# Patient Record
Sex: FEMALE | Race: BLACK OR AFRICAN AMERICAN | NOT HISPANIC OR LATINO | Employment: FULL TIME | RURAL
[De-identification: names, ages, dates, MRNs, and addresses within clinical notes are randomized per-mention and may not be internally consistent; named-entity substitution may affect disease eponyms.]

---

## 2020-10-02 ENCOUNTER — HISTORICAL (OUTPATIENT)
Dept: ADMINISTRATIVE | Facility: HOSPITAL | Age: 30
End: 2020-10-02

## 2020-10-06 LAB
REPORT: NORMAL

## 2020-10-07 LAB
BACTERIA #/AREA URNS HPF: ABNORMAL /HPF
BILIRUB UR QL STRIP: NEGATIVE MG/DL
CLARITY UR: ABNORMAL
COLOR UR: YELLOW
GLUCOSE UR STRIP-MCNC: NEGATIVE MG/DL
KETONES UR STRIP-SCNC: NEGATIVE MG/DL
LEUKOCYTE ESTERASE UR QL STRIP: ABNORMAL LEU/UL
NITRITE UR QL STRIP: NEGATIVE
PH UR STRIP: 5.5 PH UNITS (ref 5–8)
PROT UR QL STRIP: NEGATIVE MG/DL
RBC # UR STRIP: ABNORMAL ERY/UL
RBC #/AREA URNS HPF: ABNORMAL /HPF (ref 0–3)
SP GR UR STRIP: >1.03 (ref 1–1.03)
SQUAMOUS #/AREA URNS LPF: ABNORMAL /LPF
UROBILINOGEN UR STRIP-ACNC: NORMAL MG/DL
WBC #/AREA URNS HPF: ABNORMAL /HPF (ref 0–5)
YEAST #/AREA URNS HPF: ABNORMAL /HPF

## 2020-11-25 ENCOUNTER — HISTORICAL (OUTPATIENT)
Dept: ADMINISTRATIVE | Facility: HOSPITAL | Age: 30
End: 2020-11-25

## 2020-11-25 LAB — SARS-COV+SARS-COV-2 AG RESP QL IA.RAPID: NEGATIVE

## 2021-10-31 ENCOUNTER — HOSPITAL ENCOUNTER (EMERGENCY)
Facility: HOSPITAL | Age: 31
Discharge: HOME | End: 2021-10-31
Payer: COMMERCIAL

## 2021-10-31 VITALS
BODY MASS INDEX: 33.31 KG/M2 | HEART RATE: 90 BPM | TEMPERATURE: 99 F | RESPIRATION RATE: 16 BRPM | DIASTOLIC BLOOD PRESSURE: 86 MMHG | OXYGEN SATURATION: 100 % | HEIGHT: 62 IN | SYSTOLIC BLOOD PRESSURE: 129 MMHG | WEIGHT: 181 LBS

## 2021-10-31 DIAGNOSIS — R07.89 CHEST WALL PAIN: Primary | ICD-10-CM

## 2021-10-31 DIAGNOSIS — R07.9 CHEST PAIN: ICD-10-CM

## 2021-10-31 LAB
ALBUMIN SERPL BCP-MCNC: 3.8 G/DL (ref 3.5–5)
ALBUMIN/GLOB SERPL: 1 {RATIO}
ALP SERPL-CCNC: 73 U/L (ref 37–98)
ALT SERPL W P-5'-P-CCNC: 30 U/L (ref 13–56)
AMORPH PHOS CRY #/AREA URNS LPF: ABNORMAL /LPF
ANION GAP SERPL CALCULATED.3IONS-SCNC: 13 MMOL/L (ref 7–16)
AST SERPL W P-5'-P-CCNC: 19 U/L (ref 15–37)
BACTERIA #/AREA URNS HPF: ABNORMAL /HPF
BASOPHILS # BLD AUTO: 0.03 K/UL (ref 0–0.2)
BASOPHILS NFR BLD AUTO: 0.4 % (ref 0–1)
BILIRUB SERPL-MCNC: 0.5 MG/DL (ref 0–1.2)
BILIRUB UR QL STRIP: NEGATIVE
BUN SERPL-MCNC: 9 MG/DL (ref 7–18)
BUN/CREAT SERPL: 12 (ref 6–20)
CALCIUM SERPL-MCNC: 8.9 MG/DL (ref 8.5–10.1)
CHLORIDE SERPL-SCNC: 103 MMOL/L (ref 98–107)
CLARITY UR: ABNORMAL
CO2 SERPL-SCNC: 29 MMOL/L (ref 21–32)
COLOR UR: ABNORMAL
CREAT SERPL-MCNC: 0.77 MG/DL (ref 0.55–1.02)
DIFFERENTIAL METHOD BLD: ABNORMAL
EOSINOPHIL # BLD AUTO: 0.06 K/UL (ref 0–0.5)
EOSINOPHIL NFR BLD AUTO: 0.8 % (ref 1–4)
ERYTHROCYTE [DISTWIDTH] IN BLOOD BY AUTOMATED COUNT: 14.4 % (ref 11.5–14.5)
GLOBULIN SER-MCNC: 4 G/DL (ref 2–4)
GLUCOSE SERPL-MCNC: 81 MG/DL (ref 74–106)
GLUCOSE UR STRIP-MCNC: NEGATIVE MG/DL
HCG SERUM QUALITATIVE: NEGATIVE
HCT VFR BLD AUTO: 40.3 % (ref 38–47)
HGB BLD-MCNC: 13.7 G/DL (ref 12–16)
IMM GRANULOCYTES # BLD AUTO: 0.02 K/UL (ref 0–0.04)
IMM GRANULOCYTES NFR BLD: 0.3 % (ref 0–0.4)
KETONES UR STRIP-SCNC: NEGATIVE MG/DL
LEUKOCYTE ESTERASE UR QL STRIP: ABNORMAL
LYMPHOCYTES # BLD AUTO: 3.11 K/UL (ref 1–4.8)
LYMPHOCYTES NFR BLD AUTO: 42.4 % (ref 27–41)
MCH RBC QN AUTO: 28.7 PG (ref 27–31)
MCHC RBC AUTO-ENTMCNC: 34 G/DL (ref 32–36)
MCV RBC AUTO: 84.5 FL (ref 80–96)
MONOCYTES # BLD AUTO: 0.84 K/UL (ref 0–0.8)
MONOCYTES NFR BLD AUTO: 11.5 % (ref 2–6)
MPC BLD CALC-MCNC: 9.9 FL (ref 9.4–12.4)
MUCOUS THREADS #/AREA URNS HPF: ABNORMAL /HPF
NEUTROPHILS # BLD AUTO: 3.27 K/UL (ref 1.8–7.7)
NEUTROPHILS NFR BLD AUTO: 44.6 % (ref 53–65)
NITRITE UR QL STRIP: NEGATIVE
NRBC # BLD AUTO: 0 X10E3/UL
NRBC, AUTO (.00): 0 %
NT-PROBNP SERPL-MCNC: 47 PG/ML (ref 1–125)
PH UR STRIP: 8 PH UNITS
PLATELET # BLD AUTO: 248 K/UL (ref 150–400)
POTASSIUM SERPL-SCNC: 3.8 MMOL/L (ref 3.5–5.1)
PROT SERPL-MCNC: 7.8 G/DL (ref 6.4–8.2)
PROT UR QL STRIP: NEGATIVE
RBC # BLD AUTO: 4.77 M/UL (ref 4.2–5.4)
RBC # UR STRIP: NEGATIVE /UL
RBC #/AREA URNS HPF: ABNORMAL /HPF
SODIUM SERPL-SCNC: 141 MMOL/L (ref 136–145)
SP GR UR STRIP: 1.02
SQUAMOUS #/AREA URNS LPF: ABNORMAL /LPF
TROPONIN I SERPL-MCNC: <0.017 NG/ML
UROBILINOGEN UR STRIP-ACNC: 0.2 MG/DL
WBC # BLD AUTO: 7.33 K/UL (ref 4.5–11)
WBC #/AREA URNS HPF: ABNORMAL /HPF

## 2021-10-31 PROCEDURE — 84075 ASSAY ALKALINE PHOSPHATASE: CPT | Performed by: NURSE PRACTITIONER

## 2021-10-31 PROCEDURE — 36415 COLL VENOUS BLD VENIPUNCTURE: CPT | Performed by: NURSE PRACTITIONER

## 2021-10-31 PROCEDURE — 84703 CHORIONIC GONADOTROPIN ASSAY: CPT | Performed by: NURSE PRACTITIONER

## 2021-10-31 PROCEDURE — 83880 ASSAY OF NATRIURETIC PEPTIDE: CPT | Performed by: NURSE PRACTITIONER

## 2021-10-31 PROCEDURE — 93005 ELECTROCARDIOGRAM TRACING: CPT

## 2021-10-31 PROCEDURE — 84484 ASSAY OF TROPONIN QUANT: CPT | Performed by: NURSE PRACTITIONER

## 2021-10-31 PROCEDURE — 81001 URINALYSIS AUTO W/SCOPE: CPT | Performed by: NURSE PRACTITIONER

## 2021-10-31 PROCEDURE — 96372 THER/PROPH/DIAG INJ SC/IM: CPT

## 2021-10-31 PROCEDURE — 85025 COMPLETE CBC W/AUTO DIFF WBC: CPT | Performed by: NURSE PRACTITIONER

## 2021-10-31 PROCEDURE — 93010 ELECTROCARDIOGRAM REPORT: CPT | Mod: ,,, | Performed by: HOSPITALIST

## 2021-10-31 PROCEDURE — 99284 PR EMERGENCY DEPT VISIT,LEVEL IV: ICD-10-PCS | Mod: ,,, | Performed by: NURSE PRACTITIONER

## 2021-10-31 PROCEDURE — 80053 COMPREHEN METABOLIC PANEL: CPT | Performed by: NURSE PRACTITIONER

## 2021-10-31 PROCEDURE — 99285 EMERGENCY DEPT VISIT HI MDM: CPT | Mod: 25

## 2021-10-31 PROCEDURE — 63600175 PHARM REV CODE 636 W HCPCS: Performed by: NURSE PRACTITIONER

## 2021-10-31 PROCEDURE — 93010 EKG 12-LEAD: ICD-10-PCS | Mod: ,,, | Performed by: HOSPITALIST

## 2021-10-31 PROCEDURE — 81003 URINALYSIS AUTO W/O SCOPE: CPT | Performed by: NURSE PRACTITIONER

## 2021-10-31 PROCEDURE — 99284 EMERGENCY DEPT VISIT MOD MDM: CPT | Mod: ,,, | Performed by: NURSE PRACTITIONER

## 2021-10-31 RX ORDER — DOXYCYCLINE 100 MG/1
CAPSULE ORAL
COMMUNITY
Start: 2021-10-25 | End: 2021-12-02

## 2021-10-31 RX ORDER — KETOROLAC TROMETHAMINE 30 MG/ML
30 INJECTION, SOLUTION INTRAMUSCULAR; INTRAVENOUS
Status: COMPLETED | OUTPATIENT
Start: 2021-10-31 | End: 2021-10-31

## 2021-10-31 RX ORDER — CLINDAMYCIN PHOSPHATE 10 UG/ML
LOTION TOPICAL
COMMUNITY
Start: 2021-09-18

## 2021-10-31 RX ADMIN — KETOROLAC TROMETHAMINE 30 MG: 30 INJECTION, SOLUTION INTRAMUSCULAR at 03:10

## 2021-11-03 ENCOUNTER — HOSPITAL ENCOUNTER (OUTPATIENT)
Dept: RADIOLOGY | Facility: HOSPITAL | Age: 31
Discharge: HOME | End: 2021-11-03
Attending: OBSTETRICS & GYNECOLOGY
Payer: COMMERCIAL

## 2021-11-03 DIAGNOSIS — E04.9 ENLARGEMENT OF THYROID: ICD-10-CM

## 2021-11-03 PROCEDURE — 76536 US EXAM OF HEAD AND NECK: CPT | Mod: 26,,, | Performed by: RADIOLOGY

## 2021-11-03 PROCEDURE — 76536 US SOFT TISSUE HEAD NECK THYROID: ICD-10-PCS | Mod: 26,,, | Performed by: RADIOLOGY

## 2021-11-03 PROCEDURE — 76536 US EXAM OF HEAD AND NECK: CPT | Mod: TC

## 2021-12-02 ENCOUNTER — OFFICE VISIT (OUTPATIENT)
Dept: FAMILY MEDICINE | Facility: CLINIC | Age: 31
End: 2021-12-02
Payer: COMMERCIAL

## 2021-12-02 VITALS
TEMPERATURE: 98 F | WEIGHT: 180.19 LBS | DIASTOLIC BLOOD PRESSURE: 88 MMHG | SYSTOLIC BLOOD PRESSURE: 122 MMHG | HEIGHT: 62 IN | BODY MASS INDEX: 33.16 KG/M2 | HEART RATE: 109 BPM | OXYGEN SATURATION: 98 % | RESPIRATION RATE: 16 BRPM

## 2021-12-02 DIAGNOSIS — R73.03 PREDIABETES: ICD-10-CM

## 2021-12-02 PROCEDURE — 99212 OFFICE O/P EST SF 10 MIN: CPT | Mod: ,,, | Performed by: NURSE PRACTITIONER

## 2021-12-02 PROCEDURE — 99212 PR OFFICE/OUTPT VISIT, EST, LEVL II, 10-19 MIN: ICD-10-PCS | Mod: ,,, | Performed by: NURSE PRACTITIONER

## 2022-04-01 ENCOUNTER — OFFICE VISIT (OUTPATIENT)
Dept: FAMILY MEDICINE | Facility: CLINIC | Age: 32
End: 2022-04-01
Payer: COMMERCIAL

## 2022-04-01 VITALS
SYSTOLIC BLOOD PRESSURE: 132 MMHG | TEMPERATURE: 98 F | HEART RATE: 84 BPM | BODY MASS INDEX: 32.2 KG/M2 | HEIGHT: 62 IN | RESPIRATION RATE: 16 BRPM | WEIGHT: 175 LBS | OXYGEN SATURATION: 98 % | DIASTOLIC BLOOD PRESSURE: 90 MMHG

## 2022-04-01 DIAGNOSIS — R42 DIZZINESS: Primary | ICD-10-CM

## 2022-04-01 DIAGNOSIS — R42 DIZZINESS AND GIDDINESS: ICD-10-CM

## 2022-04-01 PROCEDURE — 1159F PR MEDICATION LIST DOCUMENTED IN MEDICAL RECORD: ICD-10-PCS | Mod: ,,, | Performed by: NURSE PRACTITIONER

## 2022-04-01 PROCEDURE — 1160F RVW MEDS BY RX/DR IN RCRD: CPT | Mod: ,,, | Performed by: NURSE PRACTITIONER

## 2022-04-01 PROCEDURE — 3080F PR MOST RECENT DIASTOLIC BLOOD PRESSURE >= 90 MM HG: ICD-10-PCS | Mod: ,,, | Performed by: NURSE PRACTITIONER

## 2022-04-01 PROCEDURE — 3080F DIAST BP >= 90 MM HG: CPT | Mod: ,,, | Performed by: NURSE PRACTITIONER

## 2022-04-01 PROCEDURE — 99213 PR OFFICE/OUTPT VISIT, EST, LEVL III, 20-29 MIN: ICD-10-PCS | Mod: ,,, | Performed by: NURSE PRACTITIONER

## 2022-04-01 PROCEDURE — 99213 OFFICE O/P EST LOW 20 MIN: CPT | Mod: ,,, | Performed by: NURSE PRACTITIONER

## 2022-04-01 PROCEDURE — 1160F PR REVIEW ALL MEDS BY PRESCRIBER/CLIN PHARMACIST DOCUMENTED: ICD-10-PCS | Mod: ,,, | Performed by: NURSE PRACTITIONER

## 2022-04-01 PROCEDURE — 3075F SYST BP GE 130 - 139MM HG: CPT | Mod: ,,, | Performed by: NURSE PRACTITIONER

## 2022-04-01 PROCEDURE — 3008F BODY MASS INDEX DOCD: CPT | Mod: ,,, | Performed by: NURSE PRACTITIONER

## 2022-04-01 PROCEDURE — 1159F MED LIST DOCD IN RCRD: CPT | Mod: ,,, | Performed by: NURSE PRACTITIONER

## 2022-04-01 PROCEDURE — 3008F PR BODY MASS INDEX (BMI) DOCUMENTED: ICD-10-PCS | Mod: ,,, | Performed by: NURSE PRACTITIONER

## 2022-04-01 PROCEDURE — 3075F PR MOST RECENT SYSTOLIC BLOOD PRESS GE 130-139MM HG: ICD-10-PCS | Mod: ,,, | Performed by: NURSE PRACTITIONER

## 2022-04-01 RX ORDER — MECLIZINE HYDROCHLORIDE 50 MG/1
50 TABLET ORAL 2 TIMES DAILY
Qty: 60 TABLET | Refills: 2 | Status: SHIPPED | OUTPATIENT
Start: 2022-04-01 | End: 2023-02-27

## 2022-04-01 RX ORDER — SEMAGLUTIDE 1.34 MG/ML
0.5 INJECTION, SOLUTION SUBCUTANEOUS
COMMUNITY
Start: 2022-03-13 | End: 2023-02-27

## 2022-04-01 RX ORDER — CHOLECALCIFEROL (VITAMIN D3) 25 MCG
1000 TABLET ORAL DAILY
COMMUNITY
End: 2023-02-27

## 2022-04-01 NOTE — PROGRESS NOTES
"Subjective:       Patient ID: Renee Vargas is a 31 y.o. female.    Chief Complaint: Dizziness    Patient here today with dizziness for several years which occurs a few times every week either while lying still, sitting, or occasionally while standing. Patient denies dizziness related to positional changes. Patient describes dizzy spells as the sensation of her head "swimming." Additionally, patient reports intermittent throbbing of posterior neck. Patient saw neurologist in 2018 to evaluate who prescribed a medication for dizziness. They advised patient to only take this medicine for long spells of dizziness, so patient never took the medication because her dizzy spells only last a few minutes. Patient was told she would need a referral to return to neurology. Patient has not been evaluated by ENT.     Review of Systems   Constitutional: Negative for appetite change, fatigue, fever and unexpected weight change.   HENT: Negative for nasal congestion, ear discharge, ear pain, nosebleeds and sinus pressure/congestion.    Eyes: Negative for visual disturbance.   Respiratory: Negative for cough, chest tightness and shortness of breath.    Cardiovascular: Negative for chest pain, palpitations and leg swelling.   Gastrointestinal: Negative for abdominal distention, abdominal pain, change in bowel habit and change in bowel habit.   Genitourinary: Negative for dysuria.   Musculoskeletal: Positive for neck pain. Negative for back pain and gait problem.   Integumentary:  Positive for mole/lesion (Patient of [facility]). Negative for rash.   Neurological: Positive for dizziness. Negative for tremors, seizures, speech difficulty, weakness and headaches.   Hematological: Negative for adenopathy.   Psychiatric/Behavioral: Negative for sleep disturbance.         Objective:      Physical Exam  Vitals reviewed.   Constitutional:       General: She is not in acute distress.     Appearance: Normal appearance. She is not " ill-appearing.   HENT:      Right Ear: Tympanic membrane, ear canal and external ear normal.      Left Ear: Ear canal and external ear normal. There is impacted cerumen.      Nose: Nose normal.      Mouth/Throat:      Mouth: Mucous membranes are moist.   Eyes:      General:         Right eye: No discharge.         Left eye: No discharge.      Pupils: Pupils are equal, round, and reactive to light.   Cardiovascular:      Rate and Rhythm: Normal rate and regular rhythm.      Pulses: Normal pulses.      Heart sounds: Normal heart sounds.   Pulmonary:      Effort: Pulmonary effort is normal.      Breath sounds: Normal breath sounds.   Abdominal:      Palpations: Abdomen is soft.   Musculoskeletal:         General: Normal range of motion.      Cervical back: Normal range of motion and neck supple.   Lymphadenopathy:      Cervical: No cervical adenopathy.   Skin:     General: Skin is warm and dry.      Capillary Refill: Capillary refill takes less than 2 seconds.   Neurological:      Mental Status: She is alert and oriented to person, place, and time.   Psychiatric:         Mood and Affect: Mood normal.         Behavior: Behavior normal.         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 Will start meclizine. She has been evaluated by ENT and neurology and all was wnl. Will get MRI and if abnormal will refer if needed.      RTC for annual HY and PRN

## 2022-04-12 ENCOUNTER — HOSPITAL ENCOUNTER (OUTPATIENT)
Dept: RADIOLOGY | Facility: HOSPITAL | Age: 32
Discharge: HOME | End: 2022-04-12
Attending: NURSE PRACTITIONER
Payer: COMMERCIAL

## 2022-04-12 DIAGNOSIS — R42 DIZZINESS AND GIDDINESS: ICD-10-CM

## 2022-04-12 PROCEDURE — 70551 MRI BRAIN STEM W/O DYE: CPT | Mod: TC

## 2022-04-12 PROCEDURE — 70551 MRI BRAIN STEM W/O DYE: CPT | Mod: 26,,, | Performed by: RADIOLOGY

## 2022-04-12 PROCEDURE — 70551 MRI BRAIN WITHOUT CONTRAST: ICD-10-PCS | Mod: 26,,, | Performed by: RADIOLOGY

## 2023-02-27 ENCOUNTER — OFFICE VISIT (OUTPATIENT)
Dept: FAMILY MEDICINE | Facility: CLINIC | Age: 33
End: 2023-02-27
Payer: COMMERCIAL

## 2023-02-27 VITALS
DIASTOLIC BLOOD PRESSURE: 80 MMHG | SYSTOLIC BLOOD PRESSURE: 110 MMHG | TEMPERATURE: 98 F | HEIGHT: 62 IN | RESPIRATION RATE: 16 BRPM | WEIGHT: 179.38 LBS | OXYGEN SATURATION: 96 % | BODY MASS INDEX: 33.01 KG/M2 | HEART RATE: 91 BPM

## 2023-02-27 DIAGNOSIS — G89.29 CHRONIC BILATERAL LOW BACK PAIN WITHOUT SCIATICA: Primary | Chronic | ICD-10-CM

## 2023-02-27 DIAGNOSIS — M62.838 MUSCLE SPASM: ICD-10-CM

## 2023-02-27 DIAGNOSIS — M54.50 CHRONIC BILATERAL LOW BACK PAIN WITHOUT SCIATICA: Primary | Chronic | ICD-10-CM

## 2023-02-27 DIAGNOSIS — G89.29 CHRONIC SCAPULAR PAIN: Chronic | ICD-10-CM

## 2023-02-27 DIAGNOSIS — M89.8X1 CHRONIC SCAPULAR PAIN: Chronic | ICD-10-CM

## 2023-02-27 PROCEDURE — 3079F PR MOST RECENT DIASTOLIC BLOOD PRESSURE 80-89 MM HG: ICD-10-PCS | Mod: ,,, | Performed by: NURSE PRACTITIONER

## 2023-02-27 PROCEDURE — 3008F BODY MASS INDEX DOCD: CPT | Mod: ,,, | Performed by: NURSE PRACTITIONER

## 2023-02-27 PROCEDURE — 3079F DIAST BP 80-89 MM HG: CPT | Mod: ,,, | Performed by: NURSE PRACTITIONER

## 2023-02-27 PROCEDURE — 1160F RVW MEDS BY RX/DR IN RCRD: CPT | Mod: ,,, | Performed by: NURSE PRACTITIONER

## 2023-02-27 PROCEDURE — 1160F PR REVIEW ALL MEDS BY PRESCRIBER/CLIN PHARMACIST DOCUMENTED: ICD-10-PCS | Mod: ,,, | Performed by: NURSE PRACTITIONER

## 2023-02-27 PROCEDURE — 99214 OFFICE O/P EST MOD 30 MIN: CPT | Mod: ,,, | Performed by: NURSE PRACTITIONER

## 2023-02-27 PROCEDURE — 1159F MED LIST DOCD IN RCRD: CPT | Mod: ,,, | Performed by: NURSE PRACTITIONER

## 2023-02-27 PROCEDURE — 3074F PR MOST RECENT SYSTOLIC BLOOD PRESSURE < 130 MM HG: ICD-10-PCS | Mod: ,,, | Performed by: NURSE PRACTITIONER

## 2023-02-27 PROCEDURE — 3074F SYST BP LT 130 MM HG: CPT | Mod: ,,, | Performed by: NURSE PRACTITIONER

## 2023-02-27 PROCEDURE — 3008F PR BODY MASS INDEX (BMI) DOCUMENTED: ICD-10-PCS | Mod: ,,, | Performed by: NURSE PRACTITIONER

## 2023-02-27 PROCEDURE — 99214 PR OFFICE/OUTPT VISIT, EST, LEVL IV, 30-39 MIN: ICD-10-PCS | Mod: ,,, | Performed by: NURSE PRACTITIONER

## 2023-02-27 PROCEDURE — 1159F PR MEDICATION LIST DOCUMENTED IN MEDICAL RECORD: ICD-10-PCS | Mod: ,,, | Performed by: NURSE PRACTITIONER

## 2023-02-27 RX ORDER — IBUPROFEN 800 MG/1
800 TABLET ORAL 3 TIMES DAILY
Qty: 90 TABLET | Refills: 0 | Status: SHIPPED | OUTPATIENT
Start: 2023-02-27 | End: 2023-11-27 | Stop reason: SDUPTHER

## 2023-02-27 RX ORDER — CYCLOBENZAPRINE HCL 10 MG
10 TABLET ORAL NIGHTLY
Qty: 30 TABLET | Refills: 0 | Status: SHIPPED | OUTPATIENT
Start: 2023-02-27 | End: 2023-03-09

## 2023-02-27 NOTE — ASSESSMENT & PLAN NOTE
Will refer to PT for upper and lower back pain  IBU 800mg and cyclobenzaprine prn  Advised stretching and improving posture  Discussed hypertrophy of breast--but she was not open to breast reduction at this time

## 2023-02-27 NOTE — PROGRESS NOTES
Subjective:       Patient ID: Renee Vargas is a 32 y.o. female.    Chief Complaint: Back Pain (Upper and lower back pain that is intermittent.  No pain at present.)    Upper left back pain and chronic lower back pain    Back Pain  This is a recurrent problem. The current episode started more than 1 year ago. The problem occurs every several days. The problem has been gradually worsening since onset. The pain is present in the lumbar spine, sacro-iliac, thoracic spine and costovertebral angle. The quality of the pain is described as aching, shooting and stabbing. The pain radiates to the left thigh and right thigh (only occasionally). The pain is at a severity of 7/10. The pain is moderate. The pain is The same all the time. The symptoms are aggravated by lying down, twisting and stress. Stiffness is present All day. Associated symptoms include leg pain and weakness. Pertinent negatives include no abdominal pain, bladder incontinence, bowel incontinence, chest pain, dysuria, fever, headaches, numbness, paresis, paresthesias, pelvic pain, perianal numbness, tingling or weight loss. Risk factors include obesity, lack of exercise, poor posture and sedentary lifestyle. She has tried nothing for the symptoms. The treatment provided no relief.   Review of Systems   Constitutional:  Negative for appetite change, fatigue, fever, unexpected weight change and weight loss.   Eyes:  Negative for visual disturbance.   Respiratory:  Negative for cough, chest tightness and shortness of breath.    Cardiovascular:  Negative for chest pain, palpitations and leg swelling.   Gastrointestinal:  Negative for abdominal distention, abdominal pain, bowel incontinence, change in bowel habit and change in bowel habit.   Genitourinary:  Negative for bladder incontinence, dysuria and pelvic pain.   Musculoskeletal:  Positive for back pain and leg pain. Negative for gait problem.   Integumentary:  Negative for rash and mole/lesion.    Neurological:  Positive for weakness. Negative for dizziness, tingling, numbness, headaches and paresthesias.   Hematological:  Negative for adenopathy.   Psychiatric/Behavioral:  Negative for sleep disturbance.        Objective:      Physical Exam  Vitals reviewed.   Eyes:      Pupils: Pupils are equal, round, and reactive to light.   Cardiovascular:      Rate and Rhythm: Normal rate and regular rhythm.      Pulses: Normal pulses.      Heart sounds: Normal heart sounds.   Pulmonary:      Effort: Pulmonary effort is normal.      Breath sounds: Normal breath sounds.   Abdominal:      Palpations: Abdomen is soft.   Musculoskeletal:         General: Normal range of motion.      Cervical back: Normal range of motion and neck supple.      Thoracic back: Tenderness present. No swelling, edema, deformity, signs of trauma, lacerations, spasms or bony tenderness. Normal range of motion. No scoliosis.      Lumbar back: Tenderness present. No swelling, edema, deformity, signs of trauma, lacerations, spasms or bony tenderness. Normal range of motion. Negative right straight leg raise test and negative left straight leg raise test. No scoliosis.        Back:    Lymphadenopathy:      Cervical: No cervical adenopathy.   Skin:     General: Skin is warm and dry.      Capillary Refill: Capillary refill takes less than 2 seconds.   Neurological:      Mental Status: She is alert and oriented to person, place, and time.   Psychiatric:         Mood and Affect: Mood normal.         Behavior: Behavior normal.       Assessment:       Problem List Items Addressed This Visit          Orthopedic    Chronic scapular pain    Relevant Orders    Ambulatory referral/consult to Physical/Occupational Therapy    Chronic bilateral low back pain without sciatica - Primary    Relevant Medications    ibuprofen (ADVIL,MOTRIN) 800 MG tablet    Other Relevant Orders    Ambulatory referral/consult to Physical/Occupational Therapy    Muscle spasm    Relevant  Medications    cyclobenzaprine (FLEXERIL) 10 MG tablet         Plan:       Chronic bilateral low back pain without sciatica  Will refer to PT for upper and lower back pain  IBU 800mg and cyclobenzaprine prn  Advised stretching and improving posture  Discussed hypertrophy of breast--but she was not open to breast reduction at this time        Answers submitted by the patient for this visit:  Back Pain Questionnaire (Submitted on 2/26/2023)  Chief Complaint: Back pain

## 2023-02-27 NOTE — LETTER
February 27, 2023      [facility]  [address] [address]  Phone: [phone]  Fax: [phone]       Patient: Renee Vargas   YOB: 1990  Date of Visit: 02/27/2023    To Whom It May Concern:    Tomasa Vargas  was at [facility] on 02/27/2023. The patient may return to work/school on 02/28/23 with no restrictions. If you have any questions or concerns, or if I can be of further assistance, please do not hesitate to contact me.    Sincerely,    ABDIRIZAK Gloria

## 2023-03-08 NOTE — PROGRESS NOTES
See Plan of Care     Patient has some Right sided SI dysfunction   Re-Check LLD and provide METs if needed     Establish a Home Exercise Program and work up to machine exercises         Back Exercises    Bike/NuStep                 Calf Stretch    Hamstring Stretch    Pelvic Tilts    Bridging    Bridging with Abduction    Bridging/Hooklying with Marching    Bridging/Hooklying with Knee Ext    Trunk Rotation Exercise    Trunk Rotation Stretch    Ball - Trunk Rotation    Ball- Knee Extension    Planks    Quadraped

## 2023-03-10 ENCOUNTER — CLINICAL SUPPORT (OUTPATIENT)
Dept: REHABILITATION | Facility: HOSPITAL | Age: 33
End: 2023-03-10
Payer: COMMERCIAL

## 2023-03-10 DIAGNOSIS — M89.8X1 CHRONIC SCAPULAR PAIN: Chronic | ICD-10-CM

## 2023-03-10 DIAGNOSIS — G89.29 CHRONIC SCAPULAR PAIN: Chronic | ICD-10-CM

## 2023-03-10 DIAGNOSIS — M54.6 THORACIC SPINE PAIN: ICD-10-CM

## 2023-03-10 DIAGNOSIS — R29.898 WEAKNESS OF BOTH LOWER EXTREMITIES: ICD-10-CM

## 2023-03-10 DIAGNOSIS — R29.898 WEAKNESS OF BACK: ICD-10-CM

## 2023-03-10 DIAGNOSIS — M53.3 SACROILIAC PAIN: Primary | ICD-10-CM

## 2023-03-10 DIAGNOSIS — M54.50 CHRONIC BILATERAL LOW BACK PAIN WITHOUT SCIATICA: Chronic | ICD-10-CM

## 2023-03-10 DIAGNOSIS — G89.29 CHRONIC BILATERAL LOW BACK PAIN WITHOUT SCIATICA: Chronic | ICD-10-CM

## 2023-03-10 PROCEDURE — 97140 MANUAL THERAPY 1/> REGIONS: CPT

## 2023-03-10 PROCEDURE — 97161 PT EVAL LOW COMPLEX 20 MIN: CPT

## 2023-03-10 NOTE — PLAN OF CARE
[facility]   Physical Therapy Initial Evaluation    Date: 3/10/2023   Name: Renee Vargas  Clinic Number: [id number]    Therapy Diagnosis: Back Pain   Physician: Yamileth Castrejon FNP    Physician Orders: PT Eval and Treat   Medical Diagnosis from Referral: Thoracic and Low Back Pain  Evaluation Date: 3/10/2023  Authorization Period Expiration: 2/27/2024  Plan of Care Expiration: 5/5/2023  Visit # / Visits authorized: 1/ 20    Time In: 11:00 pm   Time Out: 12:00 pm   Total Appointment Time (timed & untimed codes): 60 minutes    Precautions: Standard    Subjective   Date of onset: 2/11/2023  History of current condition - Renee reports: She has had low back pain for years. The upper back pain started February 11, 2023. She was brushing her teeth and a sharp pain hit between her shoulder blades that caused her to hit her knees. She reports intense pain for approx 1 hour. The pain began to ease up a little after that. She reports this happens off and on and is completely random. At times she can just be sitting still and it will happen. The low back pain is constant but the Thoracic pain is intermittent. Her low back pain worsens with lying down and she has trouble rolling over. The low back pain is a little better when she is up and moving.     Patient saw her MD on 2/27/2023. MD Note States in part :   This is a recurrent problem. The current episode started more than 1 year ago. The problem occurs every several days. The problem has been gradually worsening since onset. The pain is present in the lumbar spine, sacro-iliac, thoracic spine and costovertebral angle. The quality of the pain is described as aching, shooting and stabbing. The pain radiates to the left thigh and right thigh (only occasionally). The pain is at a severity of 7/10. The pain is moderate. The pain is The same all the time. The symptoms are aggravated by lying down, twisting and stress. Stiffness is present All day.  "Associated symptoms include leg pain and weakness. Pertinent negatives include no abdominal pain, bladder incontinence, bowel incontinence, chest pain, dysuria, fever, headaches, numbness, paresis, paresthesias, pelvic pain, perianal numbness, tingling or weight loss. Risk factors include obesity, lack of exercise, poor posture and sedentary lifestyle     Plan : Chronic bilateral low back pain without sciatica  Will refer to PT for upper and lower back pain  IBU 800mg and cyclobenzaprine prn  Advised stretching and improving posture  Discussed hypertrophy of breast--but she was not open to breast reduction at this time     Medical History:   No past medical history on file.    Surgical History:   Renee Vargas  has no past surgical history on file.    Medications:   Renee has a current medication list which includes the following prescription(s): clindamycin, cyclobenzaprine, and ibuprofen.    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 I hate medicine."    Objective       Posture :     Standing Lordosis        [x]  Increased   []   Decreased   []  Within Normal Limits  Sitting Lordosis  [x]  Increased   []   Decreased   []  Within Normal Limits   Iliac Crest Height  [x]  Left Increased      [] Equal/Level  PSIS Height    [x]  Right Increased      [] Equal/Level  Pelvic Rot/Torsion       []   " Yes     [x]   No  Scoliosis    []  Yes   Concave Right/Left      [x]  No  Lateral Shift    []   Right     []   Left          [x]  Within Normal Limits  Comments : It appears she has an Anterior Rotation on the Right. Right PSIS is more painful and is hypomobile. Right leg was slightly longer in supine. Attempted an MET but was unsuccessful. Will continue to assess.         Strength :      Myotome/Muscle :  Left   Right     L1-2    Psoas  4-/5  3+/5    L3       Quadriceps  4+/5  4+/5    L4       Anterior Tibialis  4+/5  4+/5    L5       EHL   5/5   5/5     S1      Gastocnemius  5/5  5/5    S2      Hamstrings  4-/5  3+/5                    Strength :   Abdominals 3/5  Back Extensors 3/5  Multifidus 3/5      Range of Motion :     Back :    Forward Flexion 120 degrees    Back Bending 20 degrees    Side Bending Left 45 degrees   Side Bending Right 45 degrees    Rotation Left 75%   Rotation Right 75%    Hip :   Hamstrings : Slight Tightness Bilaterally   Piriformis : Slight Tightness Bilaterally - Right is painful       Special Tests :     SLR :   Right   +/- <60 Degrees     []   yes   [x]  No  ;   +/-  60-90 Degrees     []   Yes    [x]  No   Left     +/- <60 Degrees      []  yes    [x] No ;   +/-  60-90 Degrees       []   Yes    [x] No    Sitting Slump Test :  Left : [] Positive   [x]  Negative ;  Right : [] Positive   [x]  Negative   Lower Extremity Length :   [x] Right Longer     []  Within Normal Limits ; No actual LLD - this is due to rotation at the SI joint   MARLY : Left : [] Positive   []  Negative ;  Right : [] Positive   []  Negative       SI Joint :   Palpation   [x] Positive   []  Negative   Compression   [x] Positive   []  Negative   Distraction  [] Positive   [x]  Negative   Forward Bending [x] Positive   []  Negative       Dermatome : No numbness or tingling       Palpation : Muscle spasms noted along paraspinals from T Spine through sacrum. Piriformis is TTP.     T Spine :   Patient presents with forward  head and rounded shoulders  Shoulder Strength is grossly 4-/5.   Tone and spasms noted in Upper Traps and Rhomboids           Limitation/Restriction for FOTO Intake Lumbar Survey    Therapist reviewed FOTO scores for Renee Vargas on 3/10/2023.   FOTO documents entered into [employer] - see Media section.    Limitation Score: 59%         TREATMENT   Treatment Time In: 11:40 am   Treatment Time Out: 11:50 am   Total Treatment time (time-based codes) separate from Evaluation: 10 minutes      Renee received the treatments listed below:  MANUAL THERAPY TECHNIQUES including Joint mobilizations were applied to SI for 10 minutes.  Sacral Mobs   MET for Anterior Rotation of the [place]      Home Exercises and Patient Education Provided    Education provided:   - Discussed the findings from the Evaluation and Reviewed the Plan of Care    A Home Exercise Program will be created in the future       Assessment   Renee is a 32 y.o. female referred to outpatient Physical Therapy with a medical diagnosis of Low Back and T Spine Pain. Renee reports: She has had low back pain for years. The upper back pain started February 11, 2023. She was brushing her teeth and a sharp pain hit between her shoulder blades that caused her to hit her knees. She reports intense pain for approx 1 hour. The pain began to ease up a little after that. She reports this happens off and on and is completely random. At times she can just be sitting still and it will happen. The low back pain is constant but the Thoracic pain is intermittent. Her low back pain worsens with lying down and she has trouble rolling over. The low back pain is a little better when she is up and moving.   Patient presents with Poor Core, Upper Extremity, and Lower Extremity Strength. Patient appears to have lax ligaments and is double jointed in several joints. Feel this is contributing to her pain and instability. SI was assessed as most of her Low Back pain seems to be centered  here. It appears she has an Anterior Rotation on the Right. Right PSIS is more painful and is hypomobile. Right leg was slightly longer in supine. Attempted an MET but was unsuccessful. Will continue to assess. During the T Spine assessment, Patient presents with forward head and rounded shoulders. Shoulder Strength is grossly 4-/5. Tone and spasms were noted in Upper Traps and Rhomboids. Patient is generally weak overall. The weak muscles and the lax ligaments together are causing instability of her joints. She will require an overall strengthening program to help stabilize her joints.     Patient prognosis is Good.   Patientt will benefit from skilled outpatient Physical Therapy to address the deficits stated above and in the chart below, provide patient /family education, and to maximize patientt's level of independence.     Plan of care discussed with patient: Yes  Patient's spiritual, cultural and educational needs considered and patient is agreeable to the plan of care and goals as stated below:     Anticipated Barriers for therapy: Chronic Pain       Goals:  Short Term Goals: 4 weeks   1. Patient will be Independent with Home Exercise Program   2. Patient will demonstrate with improved Posture and Body Mechanics  3. Patient will increase Lumbosacral Range of Motion by 25%   4. Patient will increase Lower Extremity, Upper Extremity, and Core Strength to grossly 4+/5  5. Patient will decrease complaints of pain with activity to Less than or Equal to 5/10     Long Term Goals: 8 weeks   1. Patient will tolerate 30 minutes of activity with complaints of Pain at Less Than or Equal to 4/10        Plan   Plan of care Certification: 3/10/2023 to 5/5/2023.    Outpatient Physical Therapy 2 times weekly for 8 weeks to include the following interventions: Cervical/Lumbar Traction, Electrical Stimulation to decrease pain and inflammation as needed, Manual Therapy, Moist Heat/ Ice, Neuromuscular Re-ed, Patient Education,  Therapeutic Activities, and Therapeutic Exercise.     MARCO GOLDEN, PT, DPT       I CERTIFY THE NEED FOR THESE SERVICES FURNISHED UNDER THIS PLAN OF TREATMENT AND WHILE UNDER MY CARE.    Physician's comments:      Physician's Signature: ___________________________________________________

## 2023-03-14 ENCOUNTER — CLINICAL SUPPORT (OUTPATIENT)
Dept: REHABILITATION | Facility: HOSPITAL | Age: 33
End: 2023-03-14
Payer: COMMERCIAL

## 2023-03-14 DIAGNOSIS — R29.898 WEAKNESS OF BOTH LOWER EXTREMITIES: ICD-10-CM

## 2023-03-14 DIAGNOSIS — R29.898 WEAKNESS OF BACK: ICD-10-CM

## 2023-03-14 DIAGNOSIS — M53.3 SACROILIAC PAIN: Primary | ICD-10-CM

## 2023-03-14 DIAGNOSIS — M54.6 THORACIC SPINE PAIN: ICD-10-CM

## 2023-03-14 PROCEDURE — 97110 THERAPEUTIC EXERCISES: CPT | Mod: CQ

## 2023-03-14 PROCEDURE — 97112 NEUROMUSCULAR REEDUCATION: CPT | Mod: CQ

## 2023-03-14 NOTE — PROGRESS NOTES
[facility]   Physical Therapy Treatment Note     Name: Renee Vargas  Clinic Number: [id number]    Visit Date: 3/14/2023    Therapy Diagnosis: Back Pain   Physician: Yamileth Castrejon FNP     Physician Orders: PT Eval and Treat   Medical Diagnosis from Referral: Thoracic and Low Back Pain  Evaluation Date: 3/10/2023  Authorization Period Expiration: 2/27/2024  Plan of Care Expiration: 5/5/2023    Visit # / Visits authorized: 2/ 20  PTA Visit #: 1/5     Time In: 4:46 pm  Time Out: 5:21 pm  Total Billable Time: 35 minutes    SUBJECTIVE     Pt reports: she is feeling okay today with only 5/10 low back pain noted; no thoracic spine pain in a couple of weeks.  She was compliant with home exercise program.  Response to previous treatment: First since eval  Functional change: None    Pain: 5/10  Location: bilateral back      Prior Level of Function: Able to work and move without pain   Current Level of Function: Pain with mobility     OBJECTIVE     Objective Measures updated at progress report unless specified.     Treatment     Renee received the treatments listed below:      therapeutic exercises to develop strength, endurance, ROM, flexibility, and posture for 25 minutes including:     Bike/NuStep  3 minutes   Corner Stretch  3x30 seconds   TRX Hangouts  10x10 seconds   Calf Stretch     Hamstring Stretch  3x20 seconds (B)   Pelvic Tilts     Bridging     Bridging with Abduction  Green Band 30x   Bridging/Hooklying with Marching     Bridging/Hooklying with Knee Ext     Trunk Rotation Exercise  5x5 seconds each way   Trunk Rotation Stretch     Ball - Trunk Rotation  15x each way   Ball- Knee Extension     Planks     Quadraped     Prone Horizontal Abduction (B)  1# 10x10 sec with scapular retraction               manual therapy techniques: Joint mobilizations, Manual traction, and Myofacial release were applied to the: hips/back for 0 minutes, including:      neuromuscular  re-education activities to improve: Balance, Coordination, Kinesthetic, and Posture for 10 minutes. The following activities were included:  Retraction with Extension - green band 20x  Palloff Press (B) - green band 10x each way    therapeutic activities to improve functional performance for 0  minutes, including:      gait training to improve functional mobility and safety for 0  minutes, including:      Patient Education and Home Exercises     Home Exercises Provided and Patient Education Provided     Education provided:   - None    Written Home Exercises Provided: Patient instructed to cont prior HEP. Exercises were reviewed and Renee was able to demonstrate them prior to the end of the session.  Renee demonstrated fair  understanding of the education provided. See EMR under Patient Instructions for exercises provided during therapy sessions    ASSESSMENT     Pt tolerated all therapeutic exercises today and postural exercises with some fatigue noted. Pt has min loss of L thoracic rotation with tightness present. Progressed core/postural exercises with generalized fatigue noted. Will continue to progress as able.         PMH:  Renee is a 32 y.o. female referred to outpatient Physical Therapy with a medical diagnosis of Low Back and T Spine Pain. Renee reports: She has had low back pain for years. The upper back pain started February 11, 2023. She was brushing her teeth and a sharp pain hit between her shoulder blades that caused her to hit her knees. She reports intense pain for approx 1 hour. The pain began to ease up a little after that. She reports this happens off and on and is completely random. At times she can just be sitting still and it will happen. The low back pain is constant but the Thoracic pain is intermittent. Her low back pain worsens with lying down and she has trouble rolling over. The low back pain is a little better when she is up and moving.   Patient presents with Poor Core, Upper  Extremity, and Lower Extremity Strength. Patient appears to have lax ligaments and is double jointed in several joints. Feel this is contributing to her pain and instability. SI was assessed as most of her Low Back pain seems to be centered here. It appears she has an Anterior Rotation on the Right. Right PSIS is more painful and is hypomobile. Right leg was slightly longer in supine. Attempted an MET but was unsuccessful. Will continue to assess. During the T Spine assessment, Patient presents with forward head and rounded shoulders. Shoulder Strength is grossly 4-/5. Tone and spasms were noted in Upper Traps and Rhomboids. Patient is generally weak overall. The weak muscles and the lax ligaments together are causing instability of her joints. She will require an overall strengthening program to help stabilize her joints.       Renee Is progressing towards her goals.   Pt prognosis is Good.     Pt will continue to benefit from skilled outpatient physical therapy to address the deficits listed in the problem list box on initial evaluation, provide pt/family education and to maximize pt's level of independence in the home and community environment.     Pt's spiritual, cultural and educational needs considered and pt agreeable to plan of care and goals.     Anticipated barriers to physical therapy: Chronic low back issue    Goals: Short Term Goals: 4 weeks   1. Patient will be Independent with Home Exercise Program   2. Patient will demonstrate with improved Posture and Body Mechanics  3. Patient will increase Lumbosacral Range of Motion by 25%   4. Patient will increase Lower Extremity, Upper Extremity, and Core Strength to grossly 4+/5  5. Patient will decrease complaints of pain with activity to Less than or Equal to 5/10      Long Term Goals: 8 weeks   1. Patient will tolerate 30 minutes of activity with complaints of Pain at Less Than or Equal to 4/10         PLAN     Plan of care Certification: 3/10/2023 to  5/5/2023.     Outpatient Physical Therapy 2 times weekly for 8 weeks to include the following interventions: Cervical/Lumbar Traction, Electrical Stimulation to decrease pain and inflammation as needed, Manual Therapy, Moist Heat/ Ice, Neuromuscular Re-ed, Patient Education, Therapeutic Activities, and Therapeutic Exercise.     Brant Garcia, PTA   03/14/2023

## 2023-03-21 ENCOUNTER — CLINICAL SUPPORT (OUTPATIENT)
Dept: REHABILITATION | Facility: HOSPITAL | Age: 33
End: 2023-03-21
Payer: COMMERCIAL

## 2023-03-21 DIAGNOSIS — M54.6 THORACIC SPINE PAIN: ICD-10-CM

## 2023-03-21 DIAGNOSIS — R29.898 WEAKNESS OF BOTH LOWER EXTREMITIES: ICD-10-CM

## 2023-03-21 DIAGNOSIS — R29.898 WEAKNESS OF BACK: ICD-10-CM

## 2023-03-21 DIAGNOSIS — M53.3 SACROILIAC PAIN: Primary | ICD-10-CM

## 2023-03-21 PROCEDURE — 97112 NEUROMUSCULAR REEDUCATION: CPT

## 2023-03-21 PROCEDURE — 97110 THERAPEUTIC EXERCISES: CPT

## 2023-03-21 NOTE — PROGRESS NOTES
[facility]   Physical Therapy Treatment Note     Name: Renee Vargas  Clinic Number: [id number]    Visit Date: 3/21/2023    Therapy Diagnosis: Back Pain   Physician: Yamileth Castrejon FNP     Physician Orders: PT Eval and Treat   Medical Diagnosis from Referral: Thoracic and Low Back Pain  Evaluation Date: 3/10/2023  Authorization Period Expiration: 2/27/2024  Plan of Care Expiration: 5/5/2023    Visit # / Visits authorized: 3/ 20  PTA Visit #: 1/5     Time In: 4:50 pm  Time Out: 5:48 pm  Total Billable Time: 55 minutes    SUBJECTIVE     Pt reports: she had some slight muscle soreness after the last treatment session   She was compliant with home exercise program.  Response to previous treatment: slight muscle soreness after the last treatment session   Functional change: None    Pain: 3/10  Location: bilateral back      Prior Level of Function: Able to work and move without pain   Current Level of Function: Pain with mobility     OBJECTIVE     Objective Measures updated at progress report unless specified.     Treatment     Renee received the treatments listed below:      therapeutic exercises to develop strength, endurance, ROM, flexibility, and posture for 45 minutes including:     Bike/NuStep  3 minutes   Corner Stretch  3x30 seconds   TRX Hangouts  10x10 seconds   Calf Stretch     Hamstring Stretch  3x20 seconds (B)   Pelvic Tilts  x 10    Bridging  x 10    Bridging with Abduction  Green Band 15x   Hooklying with Marching  Green Band 10x   Hooklying with Knee Ext  10x   Trunk Rotation Exercise  x 10 bilateral    Trunk Rotation Stretch  2 x 15 sec hold    Ball - Trunk Rotation  15x each way   Ball- Knee Extension     Planks     Quadraped     Prone Horizontal Abduction (B)  1# 10x10 sec with scapular retraction               manual therapy techniques: Joint mobilizations, Manual traction, and Myofacial release were applied to the: hips/back for 0 minutes,  including:      neuromuscular re-education activities to improve: Balance, Coordination, Kinesthetic, and Posture for 10 minutes. The following activities were included:  Retraction with Extension - green band 20x  Palloff Press (B) - green band 10x each way    therapeutic activities to improve functional performance for 0  minutes, including:      gait training to improve functional mobility and safety for 0  minutes, including:      Patient Education and Home Exercises     Home Exercises Provided and Patient Education Provided     Education provided:   - Initiated the Basic Back Home Exercise Program     Written Home Exercises Provided: . Exercises were reviewed and Renee was able to demonstrate them prior to the end of the session.  Renee demonstrated fair  understanding of the education provided. See EMR under Patient Instructions for exercises provided 3/21/2023.    ASSESSMENT     Therapist initiated the basic back Home Exercise Program today. Instructed patient on proper form for all exercises. Had patient return demonstration. She will need further review on these at the next few sessions to ensure proper form. She was given a written copy of this Home Exercise Program today and a Green Theraband. She reports Right SI discomfort with some of the exercises. Adjusted her motion as needed to decrease pain. We will continue to progress patient as able.             PMH:  Renee is a 32 y.o. female referred to outpatient Physical Therapy with a medical diagnosis of Low Back and T Spine Pain. Renee reports: She has had low back pain for years. The upper back pain started February 11, 2023. She was brushing her teeth and a sharp pain hit between her shoulder blades that caused her to hit her knees. She reports intense pain for approx 1 hour. The pain began to ease up a little after that. She reports this happens off and on and is completely random. At times she can just be sitting still and it will happen. The  low back pain is constant but the Thoracic pain is intermittent. Her low back pain worsens with lying down and she has trouble rolling over. The low back pain is a little better when she is up and moving.   Patient presents with Poor Core, Upper Extremity, and Lower Extremity Strength. Patient appears to have lax ligaments and is double jointed in several joints. Feel this is contributing to her pain and instability. SI was assessed as most of her Low Back pain seems to be centered here. It appears she has an Anterior Rotation on the Right. Right PSIS is more painful and is hypomobile. Right leg was slightly longer in supine. Attempted an MET but was unsuccessful. Will continue to assess. During the T Spine assessment, Patient presents with forward head and rounded shoulders. Shoulder Strength is grossly 4-/5. Tone and spasms were noted in Upper Traps and Rhomboids. Patient is generally weak overall. The weak muscles and the lax ligaments together are causing instability of her joints. She will require an overall strengthening program to help stabilize her joints.       Renee Is progressing towards her goals.   Pt prognosis is Good.     Pt will continue to benefit from skilled outpatient physical therapy to address the deficits listed in the problem list box on initial evaluation, provide pt/family education and to maximize pt's level of independence in the home and community environment.     Pt's spiritual, cultural and educational needs considered and pt agreeable to plan of care and goals.     Anticipated barriers to physical therapy: Chronic low back issue    Goals: Short Term Goals: 4 weeks   1. Patient will be Independent with Home Exercise Program   2. Patient will demonstrate with improved Posture and Body Mechanics  3. Patient will increase Lumbosacral Range of Motion by 25%   4. Patient will increase Lower Extremity, Upper Extremity, and Core Strength to grossly 4+/5  5. Patient will decrease complaints  of pain with activity to Less than or Equal to 5/10      Long Term Goals: 8 weeks   1. Patient will tolerate 30 minutes of activity with complaints of Pain at Less Than or Equal to 4/10         PLAN     Plan of care Certification: 3/10/2023 to 5/5/2023.     Outpatient Physical Therapy 2 times weekly for 8 weeks to include the following interventions: Cervical/Lumbar Traction, Electrical Stimulation to decrease pain and inflammation as needed, Manual Therapy, Moist Heat/ Ice, Neuromuscular Re-ed, Patient Education, Therapeutic Activities, and Therapeutic Exercise.     MARCO GOLDEN, PT, DPT   03/21/2023

## 2023-03-22 ENCOUNTER — CLINICAL SUPPORT (OUTPATIENT)
Dept: REHABILITATION | Facility: HOSPITAL | Age: 33
End: 2023-03-22
Payer: COMMERCIAL

## 2023-03-22 DIAGNOSIS — R29.898 WEAKNESS OF BOTH LOWER EXTREMITIES: ICD-10-CM

## 2023-03-22 DIAGNOSIS — M54.6 THORACIC SPINE PAIN: ICD-10-CM

## 2023-03-22 DIAGNOSIS — M53.3 SACROILIAC PAIN: Primary | ICD-10-CM

## 2023-03-22 DIAGNOSIS — R29.898 WEAKNESS OF BACK: ICD-10-CM

## 2023-03-22 PROCEDURE — 97112 NEUROMUSCULAR REEDUCATION: CPT | Mod: CQ

## 2023-03-22 PROCEDURE — 97110 THERAPEUTIC EXERCISES: CPT | Mod: CQ

## 2023-03-22 NOTE — PROGRESS NOTES
[facility]   Physical Therapy Treatment Note     Name: Renee Vargas  Clinic Number: [id number]  Visit Date: 3/22/2023  Therapy Diagnosis: Back Pain   Physician: Yamileth Castrejon FNP     Physician Orders: PT Eval and Treat   Medical Diagnosis from Referral: Thoracic and Low Back Pain  Evaluation Date: 3/10/2023  Authorization Period Expiration: 2/27/2024  Plan of Care Expiration: 5/5/2023    Visit # / Visits authorized: 4/ 20  PTA Visit #: 1/5     Time In: 4:48 pm  Time Out: 5:30 pm  Total Billable Time:  42 minutes    SUBJECTIVE     Pt reports: she is not in any pain coming in today.     She was compliant with home exercise program.  Response to previous treatment: slight muscle soreness after the last treatment session   Functional change: None    Pain: 3/10  Location: bilateral back      Prior Level of Function: Able to work and move without pain   Current Level of Function: Pain with mobility     OBJECTIVE     Objective Measures updated at progress report unless specified.     Treatment     Renee received the treatments listed below:      therapeutic exercises to develop strength, endurance, ROM, flexibility, and posture for 32 minutes including:     Bike/NuStep  3 minutes bike    Corner Stretch  3x30 seconds   TRX Hangouts  10x10 seconds   Calf Stretch     Hamstring Stretch  3x20 seconds (B)       Bridging  3 x 5 with abdominal bracing    Bridging with Abduction  Green Band 15x   Hooklying with Marching  Green Band 10x bilateral    Trunk Rotation Exercise  x 10 bilateral    Trunk Rotation Stretch  2 x 15 sec hold    Ball- Knee Extension     Planks     Quadraped         manual therapy techniques: Joint mobilizations, Manual traction, and Myofacial release were applied to the: hips/back for 0 minutes, including:    neuromuscular re-education activities to improve: Balance, Coordination, Kinesthetic, and Posture for 10 minutes. The following activities were  included:    Pelvic tilt x 20 with 3 second hold for transverse abdominal bracing   Retraction with Extension - green band 15x  Palloff Press (B) - green band 10x each way    therapeutic activities to improve functional performance for 0  minutes, including:    Patient Education and Home Exercises     Home Exercises Provided and Patient Education Provided     Education provided:   - educated Patient on correct form and mechanics with the Basic Back Home Exercise Program & review of handout given at last visit    Written Home Exercises Provided: . Exercises were reviewed and Renee was able to demonstrate them prior to the end of the session.  Renee demonstrated fair  understanding of the education provided. See EMR under Patient Instructions for exercises provided 3/21/2023.    ASSESSMENT   Supervisory visit with Debby Matta DPT PT prior to initial PTA visit.     Patient arrived with no pain complaints today but did voice a little soreness during mat exercises today due to the right anterior rotation of her pelvis. Therapist reviewed the basic back Home Exercise Program today with patient again since this was first introduced at last visit. Instructed patient on proper form for all exercises and educated Patient on diligence with this to further build off of within future therapy sessions. Had patient return demonstration. Therapist informed Patient we will begin adding new exercises at next visit due to the weakness and laxity of the ligaments. She reports Right SI discomfort with some of the exercises. Adjusted her motion as needed to decrease pain. We will continue to progress patient as able.       PMH:  Renee is a 32 y.o. female referred to outpatient Physical Therapy with a medical diagnosis of Low Back and T Spine Pain. Renee reports: She has had low back pain for years. The upper back pain started February 11, 2023. She was brushing her teeth and a sharp pain hit between her shoulder blades that  caused her to hit her knees. She reports intense pain for approx 1 hour. The pain began to ease up a little after that. She reports this happens off and on and is completely random. At times she can just be sitting still and it will happen. The low back pain is constant but the Thoracic pain is intermittent. Her low back pain worsens with lying down and she has trouble rolling over. The low back pain is a little better when she is up and moving.   Patient presents with Poor Core, Upper Extremity, and Lower Extremity Strength. Patient appears to have lax ligaments and is double jointed in several joints. Feel this is contributing to her pain and instability. SI was assessed as most of her Low Back pain seems to be centered here. It appears she has an Anterior Rotation on the Right. Right PSIS is more painful and is hypomobile. Right leg was slightly longer in supine. Attempted an MET but was unsuccessful. Will continue to assess. During the T Spine assessment, Patient presents with forward head and rounded shoulders. Shoulder Strength is grossly 4-/5. Tone and spasms were noted in Upper Traps and Rhomboids. Patient is generally weak overall. The weak muscles and the lax ligaments together are causing instability of her joints. She will require an overall strengthening program to help stabilize her joints.       Renee Is progressing towards her goals.   Pt prognosis is Good.     Pt will continue to benefit from skilled outpatient physical therapy to address the deficits listed in the problem list box on initial evaluation, provide pt/family education and to maximize pt's level of independence in the home and community environment.     Pt's spiritual, cultural and educational needs considered and pt agreeable to plan of care and goals.     Anticipated barriers to physical therapy: Chronic low back issue    Goals: Short Term Goals: 4 weeks   1. Patient will be Independent with Home Exercise Program   2. Patient will  demonstrate with improved Posture and Body Mechanics  3. Patient will increase Lumbosacral Range of Motion by 25%   4. Patient will increase Lower Extremity, Upper Extremity, and Core Strength to grossly 4+/5  5. Patient will decrease complaints of pain with activity to Less than or Equal to 5/10      Long Term Goals: 8 weeks   1. Patient will tolerate 30 minutes of activity with complaints of Pain at Less Than or Equal to 4/10         PLAN     Plan of care Certification: 3/10/2023 to 5/5/2023.     Outpatient Physical Therapy 2 times weekly for 8 weeks to include the following interventions: Cervical/Lumbar Traction, Electrical Stimulation to decrease pain and inflammation as needed, Manual Therapy, Moist Heat/ Ice, Neuromuscular Re-ed, Patient Education, Therapeutic Activities, and Therapeutic Exercise.     Riley Lemus, PTA  03/22/2023

## 2023-03-28 ENCOUNTER — CLINICAL SUPPORT (OUTPATIENT)
Dept: REHABILITATION | Facility: HOSPITAL | Age: 33
End: 2023-03-28
Payer: COMMERCIAL

## 2023-03-28 DIAGNOSIS — R29.898 WEAKNESS OF BOTH LOWER EXTREMITIES: ICD-10-CM

## 2023-03-28 DIAGNOSIS — R29.898 WEAKNESS OF BACK: ICD-10-CM

## 2023-03-28 DIAGNOSIS — M53.3 SACROILIAC PAIN: Primary | ICD-10-CM

## 2023-03-28 DIAGNOSIS — M54.6 THORACIC SPINE PAIN: ICD-10-CM

## 2023-03-28 PROCEDURE — 97110 THERAPEUTIC EXERCISES: CPT | Mod: CQ

## 2023-03-28 PROCEDURE — 97112 NEUROMUSCULAR REEDUCATION: CPT | Mod: CQ

## 2023-03-28 NOTE — PROGRESS NOTES
[facility]   Physical Therapy Treatment Note     Name: Renee Vargas  Clinic Number: [id number]  Visit Date: 3/28/2023  Therapy Diagnosis: Back Pain   Physician: Yamileth Castrejon FNP     Physician Orders: PT Eval and Treat   Medical Diagnosis from Referral: Thoracic and Low Back Pain  Evaluation Date: 3/10/2023  Authorization Period Expiration: 2/27/2024  Plan of Care Expiration: 5/5/2023    Visit # / Visits authorized: 5/ 20  PTA Visit #: 2/5     Time In: 4:47 pm  Time Out: 5:29 pm  Total Billable Time:  42 minutes    SUBJECTIVE     Pt reports: she is not in any pain coming in today, reports she still has trouble sleeping due to not being able to sleep comfortably on her side. Reports compliance with home exercise program with no reports of upper back pain today, only lower.     She was compliant with home exercise program.  Response to previous treatment: slight muscle soreness after the last treatment session   Functional change: None    Pain: 0/10  Location: bilateral back      Prior Level of Function: Able to work and move without pain   Current Level of Function: Pain with mobility     OBJECTIVE     Objective Measures updated at progress report unless specified.     Treatment     Renee received the treatments listed below:      therapeutic exercises to develop strength, endurance, ROM, flexibility, and posture for 30 minutes including:     Bike/NuStep  5 minutes bike    Corner Stretch  3x30 seconds   TRX Hangouts  10x10 seconds   Calf Stretch     Hamstring Stretch  3x20 seconds (B)   Prone quad stretch  With green strap 3 x 15 second hold    Prone hip extension (bilateral) 10x ea lower extremity    Sidelying open books  10 x 10 second hold (bilateral)   Bridging Over yellow swiss ball 3 x 5 with abdominal bracing            Trunk Rotation Exercise  x 20 bilateral    Trunk Rotation Stretch  3 x 15 sec hold over yellow swiss ball   Ball- Knee Extension     Planks      Quadraped         manual therapy techniques: Joint mobilizations, Manual traction, and Myofacial release were applied to the: hips/back for 0 minutes, including:    neuromuscular re-education activities to improve: Balance, Coordination, Kinesthetic, and Posture for 12 minutes. The following activities were included:    Pelvic tilt x 20 with 3 second hold for transverse abdominal bracing   Supine Hamstring curls with tilt   yellow swiss ball 2 x 10   Pelvic tilt with Hooklying ADD squeeze  soccer ball between knees 2 x 10 with 3 second hold        therapeutic activities to improve functional performance for 0  minutes, including:    Patient Education and Home Exercises     Home Exercises Provided and Patient Education Provided     Education provided:   - educated Patient on correct form and mechanics with exercises today and reiterated importance of home exercise program.     Written Home Exercises Provided: . Exercises were reviewed and Renee was able to demonstrate them prior to the end of the session.  Renee demonstrated fair  understanding of the education provided. See EMR under Patient Instructions for exercises provided 3/21/2023.    ASSESSMENT   Supervisory visit with Debby Matta DPT PT prior to initial PTA visit.     Patient arrived with no pain complaints today but did voice a little soreness during mat exercises today due to the right anterior rotation of her pelvis. Therapist progressed mat exercises based of Patients understanding of home exercise program. Instructed patient on proper form for all exercises today and progressed to swiss ball bridging and hamstring curls. Therapist informed Patient we will continue adding new exercises at next visit due to the weakness and laxity of the ligaments. She reports Right SI discomfort with some of the exercises. Adjusted her motion as needed to decrease pain. We will continue to progress patient as able.       PMH:  Renee is a 32 y.o. female referred  to outpatient Physical Therapy with a medical diagnosis of Low Back and T Spine Pain. Renee reports: She has had low back pain for years. The upper back pain started February 11, 2023. She was brushing her teeth and a sharp pain hit between her shoulder blades that caused her to hit her knees. She reports intense pain for approx 1 hour. The pain began to ease up a little after that. She reports this happens off and on and is completely random. At times she can just be sitting still and it will happen. The low back pain is constant but the Thoracic pain is intermittent. Her low back pain worsens with lying down and she has trouble rolling over. The low back pain is a little better when she is up and moving.   Patient presents with Poor Core, Upper Extremity, and Lower Extremity Strength. Patient appears to have lax ligaments and is double jointed in several joints. Feel this is contributing to her pain and instability. SI was assessed as most of her Low Back pain seems to be centered here. It appears she has an Anterior Rotation on the Right. Right PSIS is more painful and is hypomobile. Right leg was slightly longer in supine. Attempted an MET but was unsuccessful. Will continue to assess. During the T Spine assessment, Patient presents with forward head and rounded shoulders. Shoulder Strength is grossly 4-/5. Tone and spasms were noted in Upper Traps and Rhomboids. Patient is generally weak overall. The weak muscles and the lax ligaments together are causing instability of her joints. She will require an overall strengthening program to help stabilize her joints.       Renee Is progressing towards her goals.   Pt prognosis is Good.     Pt will continue to benefit from skilled outpatient physical therapy to address the deficits listed in the problem list box on initial evaluation, provide pt/family education and to maximize pt's level of independence in the home and community environment.     Pt's spiritual,  cultural and educational needs considered and pt agreeable to plan of care and goals.     Anticipated barriers to physical therapy: Chronic low back issue    Goals: Short Term Goals: 4 weeks   1. Patient will be Independent with Home Exercise Program   2. Patient will demonstrate with improved Posture and Body Mechanics  3. Patient will increase Lumbosacral Range of Motion by 25%   4. Patient will increase Lower Extremity, Upper Extremity, and Core Strength to grossly 4+/5  5. Patient will decrease complaints of pain with activity to Less than or Equal to 5/10      Long Term Goals: 8 weeks   1. Patient will tolerate 30 minutes of activity with complaints of Pain at Less Than or Equal to 4/10         PLAN     Plan of care Certification: 3/10/2023 to 5/5/2023.     Outpatient Physical Therapy 2 times weekly for 8 weeks to include the following interventions: Cervical/Lumbar Traction, Electrical Stimulation to decrease pain and inflammation as needed, Manual Therapy, Moist Heat/ Ice, Neuromuscular Re-ed, Patient Education, Therapeutic Activities, and Therapeutic Exercise.     Riley Lemus, PTA  03/28/2023

## 2023-03-30 ENCOUNTER — CLINICAL SUPPORT (OUTPATIENT)
Dept: REHABILITATION | Facility: HOSPITAL | Age: 33
End: 2023-03-30
Payer: COMMERCIAL

## 2023-03-30 DIAGNOSIS — M53.3 SACROILIAC PAIN: Primary | ICD-10-CM

## 2023-03-30 DIAGNOSIS — R29.898 WEAKNESS OF BACK: ICD-10-CM

## 2023-03-30 DIAGNOSIS — M54.6 THORACIC SPINE PAIN: ICD-10-CM

## 2023-03-30 DIAGNOSIS — R29.898 WEAKNESS OF BOTH LOWER EXTREMITIES: ICD-10-CM

## 2023-03-30 PROCEDURE — 97110 THERAPEUTIC EXERCISES: CPT | Mod: CQ

## 2023-03-30 PROCEDURE — 97112 NEUROMUSCULAR REEDUCATION: CPT | Mod: CQ

## 2023-03-30 PROCEDURE — 97140 MANUAL THERAPY 1/> REGIONS: CPT | Mod: CQ

## 2023-03-30 NOTE — PROGRESS NOTES
[facility]   Physical Therapy Treatment Note     Name: Renee Vargas  Clinic Number: [id number]  Visit Date: 3/30/2023  Therapy Diagnosis: Back Pain   Physician: Yamileth Castrejon FNP     Physician Orders: PT Eval and Treat   Medical Diagnosis from Referral: Thoracic and Low Back Pain  Evaluation Date: 3/10/2023  Authorization Period Expiration: 2/27/2024  Plan of Care Expiration: 5/5/2023    Visit # / Visits authorized: 6/ 20  PTA Visit #: 3/5     Time In: 3:15 pm  Time Out: 4:03 pm  Total Billable Time:  48  minutes    SUBJECTIVE     Pt reports: she is still having some pain in the lower back when she is turning over in bed or trying to get up with changing positions. She was a little sore after last session but subsided later that day.  Reports compliance with home exercise program with no reports of upper back pain today, only lower.     She was compliant with home exercise program.  Response to previous treatment: slight muscle soreness after the last treatment session   Functional change: None    Pain: 0/10  Location: bilateral back      Prior Level of Function: Able to work and move without pain   Current Level of Function: Pain with mobility     OBJECTIVE     Objective Measures updated at progress report unless specified.     Treatment     Renee received the treatments listed below:      therapeutic exercises to develop strength, endurance, ROM, flexibility, and posture for 25 minutes including:     Bike/NuStep  5 minutes bike        Calf Stretch  on slantboard 3 x 20 second hold    Hamstring Stretch  3x20 seconds (bilateral)   Swiss ball rollouts forward left and right  5x each 3 second hold    Cybex rows hi / lo  2 plates 15x each   Cybex hip abduction bilateral  2 x 10 each 1 plate   Cybex abdominal  1 plate 2 x 10               Bridging Over yellow swiss ball 3 x 5 with abdominal bracing            Trunk Rotation Exercise  x 20 bilateral    Trunk Rotation Stretch   3 x 15 sec hold    Ball- Knee Extension     Planks     Quadraped          manual therapy techniques: Joint mobilizations, Manual traction, and Myofacial release were applied to the: hips/back for 8 minutes, including:  DKTC stretch  LTR manual OP   LLD assessment     neuromuscular re-education activities to improve: Balance, Coordination, Kinesthetic, and Posture for 15 minutes. The following activities were included:     Lateral step downs  2 x 10 2inch step (bilateral)   Pelvic tilt x 20 with 3 second hold for transverse abdominal bracing    Supine Hamstring curls with tilt   yellow swiss ball 2 x 10   Pelvic tilt with Hooklying ADD squeeze  soccer ball between knees 2 x 10 with 3 second hold    Cable rotation (bilateral)  #10 2 x 10 each   Bilateral Retraction with Extension   blue band 20X   Cable rotation (bilateral) #10 x 15 each side       therapeutic activities to improve functional performance for 0  minutes, including:    Patient Education and Home Exercises     Home Exercises Provided and Patient Education Provided     Education provided:   - educated Patient on correct form and mechanics with exercises and reiterated importance of home exercise program.     Written Home Exercises Provided: . Exercises were reviewed and Renee was able to demonstrate them prior to the end of the session.  Renee demonstrated fair  understanding of the education provided. See EMR under Patient Instructions for exercises provided 3/21/2023.    ASSESSMENT   Supervisory visit with Debby Matta DPT PT prior to initial PTA visit.     Patient arrived with no pain complaints today but did voice a little soreness from last treatment that subsided later that day. She is continuing to work hard during therapy and tolerated progression to cybex hip abduction bilaterally, cybex abdominal, cable rotation bilaterally, and cybex rows. She did express fatigue at conclusion of treatment after these progressions were introduced. She  reports Right SI discomfort with some of the exercises and still has to modify some exercises with cues to maintain pelvic tilt/TrA bracing during mat exercises due to the right anterior rotation of her pelvis. Introduced lateral step downs today from 2inch step with emphasis on QL hip hiking. Therapist informed Patient we will continue adding new exercises at next visit due to the weakness and laxity of the ligaments. We will continue to progress patient as able.       PMH:  Renee is a 32 y.o. female referred to outpatient Physical Therapy with a medical diagnosis of Low Back and T Spine Pain. Renee reports: She has had low back pain for years. The upper back pain started February 11, 2023. She was brushing her teeth and a sharp pain hit between her shoulder blades that caused her to hit her knees. She reports intense pain for approx 1 hour. The pain began to ease up a little after that. She reports this happens off and on and is completely random. At times she can just be sitting still and it will happen. The low back pain is constant but the Thoracic pain is intermittent. Her low back pain worsens with lying down and she has trouble rolling over. The low back pain is a little better when she is up and moving.   Patient presents with Poor Core, Upper Extremity, and Lower Extremity Strength. Patient appears to have lax ligaments and is double jointed in several joints. Feel this is contributing to her pain and instability. SI was assessed as most of her Low Back pain seems to be centered here. It appears she has an Anterior Rotation on the Right. Right PSIS is more painful and is hypomobile. Right leg was slightly longer in supine. Attempted an MET but was unsuccessful. Will continue to assess. During the T Spine assessment, Patient presents with forward head and rounded shoulders. Shoulder Strength is grossly 4-/5. Tone and spasms were noted in Upper Traps and Rhomboids. Patient is generally weak overall. The  weak muscles and the lax ligaments together are causing instability of her joints. She will require an overall strengthening program to help stabilize her joints.       Renee Is progressing towards her goals.   Pt prognosis is Good.     Pt will continue to benefit from skilled outpatient physical therapy to address the deficits listed in the problem list box on initial evaluation, provide pt/family education and to maximize pt's level of independence in the home and community environment.     Pt's spiritual, cultural and educational needs considered and pt agreeable to plan of care and goals.     Anticipated barriers to physical therapy: Chronic low back issue    Goals: Short Term Goals: 4 weeks   1. Patient will be Independent with Home Exercise Program   2. Patient will demonstrate with improved Posture and Body Mechanics  3. Patient will increase Lumbosacral Range of Motion by 25%   4. Patient will increase Lower Extremity, Upper Extremity, and Core Strength to grossly 4+/5  5. Patient will decrease complaints of pain with activity to Less than or Equal to 5/10      Long Term Goals: 8 weeks   1. Patient will tolerate 30 minutes of activity with complaints of Pain at Less Than or Equal to 4/10         PLAN     Plan of care Certification: 3/10/2023 to 5/5/2023.     Outpatient Physical Therapy 2 times weekly for 8 weeks to include the following interventions: Cervical/Lumbar Traction, Electrical Stimulation to decrease pain and inflammation as needed, Manual Therapy, Moist Heat/ Ice, Neuromuscular Re-ed, Patient Education, Therapeutic Activities, and Therapeutic Exercise.     Riley Lemus, PTA  03/30/2023

## 2023-04-25 ENCOUNTER — CLINICAL SUPPORT (OUTPATIENT)
Dept: REHABILITATION | Facility: HOSPITAL | Age: 33
End: 2023-04-25
Payer: COMMERCIAL

## 2023-04-25 DIAGNOSIS — M54.6 THORACIC SPINE PAIN: ICD-10-CM

## 2023-04-25 DIAGNOSIS — R29.898 WEAKNESS OF BOTH LOWER EXTREMITIES: ICD-10-CM

## 2023-04-25 DIAGNOSIS — R29.898 WEAKNESS OF BACK: ICD-10-CM

## 2023-04-25 DIAGNOSIS — M53.3 SACROILIAC PAIN: Primary | ICD-10-CM

## 2023-04-25 PROCEDURE — 97110 THERAPEUTIC EXERCISES: CPT

## 2023-04-25 NOTE — PROGRESS NOTES
[facility]   Physical Therapy Treatment Note     Name: Renee Vargas  Clinic Number: [id number]  Visit Date: 4/25/2023  Therapy Diagnosis: Back Pain   Physician: Yamileth Castrejon FNP     Physician Orders: PT Eval and Treat   Medical Diagnosis from Referral: Thoracic and Low Back Pain  Evaluation Date: 3/10/2023  Authorization Period Expiration: 2/27/2024  Plan of Care Expiration: 5/5/2023    Visit # / Visits authorized: 6/ 20  PTA Visit #: 3/5     Time In: 3:15 pm  Time Out: 4:03 pm  Total Billable Time:  48  minutes    SUBJECTIVE     Pt reports: she is still having some pain in the lower back when she is turning over in bed or trying to get up with changing positions. She was a little sore after last session but subsided later that day.  Reports compliance with home exercise program with no reports of upper back pain today, only lower.     She was compliant with home exercise program.  Response to previous treatment: slight muscle soreness after the last treatment session   Functional change: None    Pain: 0/10  Location: bilateral back      Prior Level of Function: Able to work and move without pain   Current Level of Function: Pain with mobility     OBJECTIVE     Objective Measures updated at progress report unless specified.     Treatment     Renee received the treatments listed below:      therapeutic exercises to develop strength, endurance, ROM, flexibility, and posture for 25 minutes including:     Bike/NuStep  5 minutes bike        Calf Stretch  on slantboard 3 x 20 second hold    Hamstring Stretch  3x20 seconds (bilateral)   Swiss ball rollouts forward left and right  5x each 3 second hold    Cybex rows hi / lo  2 plates 15x each   Cybex hip abduction bilateral  2 x 10 each 1 plate   Cybex abdominal  1 plate 2 x 10               Bridging Over yellow swiss ball 3 x 5 with abdominal bracing            Trunk Rotation Exercise  x 20 bilateral    Trunk Rotation Stretch   3 x 15 sec hold    Ball- Knee Extension     Planks     Quadraped          manual therapy techniques: Joint mobilizations, Manual traction, and Myofacial release were applied to the: hips/back for 8 minutes, including:  DKTC stretch  LTR manual OP   LLD assessment     neuromuscular re-education activities to improve: Balance, Coordination, Kinesthetic, and Posture for 15 minutes. The following activities were included:     Lateral step downs  2 x 10 2inch step (bilateral)   Pelvic tilt x 20 with 3 second hold for transverse abdominal bracing    Supine Hamstring curls with tilt   yellow swiss ball 2 x 10   Pelvic tilt with Hooklying ADD squeeze  soccer ball between knees 2 x 10 with 3 second hold    Cable rotation (bilateral)  #10 2 x 10 each   Bilateral Retraction with Extension   blue band 20X   Cable rotation (bilateral) #10 x 15 each side       therapeutic activities to improve functional performance for 0  minutes, including:    Patient Education and Home Exercises     Home Exercises Provided and Patient Education Provided     Education provided:   - educated Patient on correct form and mechanics with exercises and reiterated importance of home exercise program.     Written Home Exercises Provided: . Exercises were reviewed and Renee was able to demonstrate them prior to the end of the session.  Renee demonstrated fair  understanding of the education provided. See EMR under Patient Instructions for exercises provided 3/21/2023.    ASSESSMENT   Supervisory visit with Debby Matta DPT PT prior to initial PTA visit.     Patient arrived with no pain complaints today but did voice a little soreness from last treatment that subsided later that day. She is continuing to work hard during therapy and tolerated progression to cybex hip abduction bilaterally, cybex abdominal, cable rotation bilaterally, and cybex rows. She did express fatigue at conclusion of treatment after these progressions were introduced. She  reports Right SI discomfort with some of the exercises and still has to modify some exercises with cues to maintain pelvic tilt/TrA bracing during mat exercises due to the right anterior rotation of her pelvis. Introduced lateral step downs today from 2inch step with emphasis on QL hip hiking. Therapist informed Patient we will continue adding new exercises at next visit due to the weakness and laxity of the ligaments. We will continue to progress patient as able.       PMH:  Renee is a 32 y.o. female referred to outpatient Physical Therapy with a medical diagnosis of Low Back and T Spine Pain. Renee reports: She has had low back pain for years. The upper back pain started February 11, 2023. She was brushing her teeth and a sharp pain hit between her shoulder blades that caused her to hit her knees. She reports intense pain for approx 1 hour. The pain began to ease up a little after that. She reports this happens off and on and is completely random. At times she can just be sitting still and it will happen. The low back pain is constant but the Thoracic pain is intermittent. Her low back pain worsens with lying down and she has trouble rolling over. The low back pain is a little better when she is up and moving.   Patient presents with Poor Core, Upper Extremity, and Lower Extremity Strength. Patient appears to have lax ligaments and is double jointed in several joints. Feel this is contributing to her pain and instability. SI was assessed as most of her Low Back pain seems to be centered here. It appears she has an Anterior Rotation on the Right. Right PSIS is more painful and is hypomobile. Right leg was slightly longer in supine. Attempted an MET but was unsuccessful. Will continue to assess. During the T Spine assessment, Patient presents with forward head and rounded shoulders. Shoulder Strength is grossly 4-/5. Tone and spasms were noted in Upper Traps and Rhomboids. Patient is generally weak overall. The  weak muscles and the lax ligaments together are causing instability of her joints. She will require an overall strengthening program to help stabilize her joints.       Renee Is progressing towards her goals.   Pt prognosis is Good.     Pt will continue to benefit from skilled outpatient physical therapy to address the deficits listed in the problem list box on initial evaluation, provide pt/family education and to maximize pt's level of independence in the home and community environment.     Pt's spiritual, cultural and educational needs considered and pt agreeable to plan of care and goals.     Anticipated barriers to physical therapy: Chronic low back issue    Goals: Short Term Goals: 4 weeks   1. Patient will be Independent with Home Exercise Program   2. Patient will demonstrate with improved Posture and Body Mechanics  3. Patient will increase Lumbosacral Range of Motion by 25%   4. Patient will increase Lower Extremity, Upper Extremity, and Core Strength to grossly 4+/5  5. Patient will decrease complaints of pain with activity to Less than or Equal to 5/10      Long Term Goals: 8 weeks   1. Patient will tolerate 30 minutes of activity with complaints of Pain at Less Than or Equal to 4/10         PLAN     Plan of care Certification: 3/10/2023 to 5/5/2023.     Outpatient Physical Therapy 2 times weekly for 8 weeks to include the following interventions: Cervical/Lumbar Traction, Electrical Stimulation to decrease pain and inflammation as needed, Manual Therapy, Moist Heat/ Ice, Neuromuscular Re-ed, Patient Education, Therapeutic Activities, and Therapeutic Exercise.     Riley Lemus, PTA  04/25/2023

## 2023-07-20 ENCOUNTER — PATIENT MESSAGE (OUTPATIENT)
Dept: ADMINISTRATIVE | Facility: HOSPITAL | Age: 33
End: 2023-07-20

## 2023-08-29 PROBLEM — R29.898 WEAKNESS OF BOTH LOWER EXTREMITIES: Status: RESOLVED | Noted: 2023-03-10 | Resolved: 2023-08-29

## 2023-08-29 PROBLEM — R29.898 WEAKNESS OF BACK: Status: RESOLVED | Noted: 2023-03-10 | Resolved: 2023-08-29

## 2023-08-29 PROBLEM — M54.6 THORACIC SPINE PAIN: Status: RESOLVED | Noted: 2023-03-10 | Resolved: 2023-08-29

## 2023-08-29 PROBLEM — M53.3 SACROILIAC PAIN: Status: RESOLVED | Noted: 2023-03-10 | Resolved: 2023-08-29

## 2023-11-27 ENCOUNTER — OFFICE VISIT (OUTPATIENT)
Dept: FAMILY MEDICINE | Facility: CLINIC | Age: 33
End: 2023-11-27
Payer: COMMERCIAL

## 2023-11-27 VITALS
WEIGHT: 178 LBS | HEART RATE: 83 BPM | BODY MASS INDEX: 32.76 KG/M2 | HEIGHT: 62 IN | DIASTOLIC BLOOD PRESSURE: 75 MMHG | OXYGEN SATURATION: 98 % | TEMPERATURE: 99 F | RESPIRATION RATE: 16 BRPM | SYSTOLIC BLOOD PRESSURE: 112 MMHG

## 2023-11-27 DIAGNOSIS — M25.512 ACUTE PAIN OF LEFT SHOULDER: Primary | ICD-10-CM

## 2023-11-27 PROCEDURE — 3008F PR BODY MASS INDEX (BMI) DOCUMENTED: ICD-10-PCS | Mod: ,,, | Performed by: NURSE PRACTITIONER

## 2023-11-27 PROCEDURE — 3074F PR MOST RECENT SYSTOLIC BLOOD PRESSURE < 130 MM HG: ICD-10-PCS | Mod: ,,, | Performed by: NURSE PRACTITIONER

## 2023-11-27 PROCEDURE — 1160F RVW MEDS BY RX/DR IN RCRD: CPT | Mod: ,,, | Performed by: NURSE PRACTITIONER

## 2023-11-27 PROCEDURE — 1160F PR REVIEW ALL MEDS BY PRESCRIBER/CLIN PHARMACIST DOCUMENTED: ICD-10-PCS | Mod: ,,, | Performed by: NURSE PRACTITIONER

## 2023-11-27 PROCEDURE — 3078F DIAST BP <80 MM HG: CPT | Mod: ,,, | Performed by: NURSE PRACTITIONER

## 2023-11-27 PROCEDURE — 99213 PR OFFICE/OUTPT VISIT, EST, LEVL III, 20-29 MIN: ICD-10-PCS | Mod: ,,, | Performed by: NURSE PRACTITIONER

## 2023-11-27 PROCEDURE — 99213 OFFICE O/P EST LOW 20 MIN: CPT | Mod: ,,, | Performed by: NURSE PRACTITIONER

## 2023-11-27 PROCEDURE — 1159F MED LIST DOCD IN RCRD: CPT | Mod: ,,, | Performed by: NURSE PRACTITIONER

## 2023-11-27 PROCEDURE — 1159F PR MEDICATION LIST DOCUMENTED IN MEDICAL RECORD: ICD-10-PCS | Mod: ,,, | Performed by: NURSE PRACTITIONER

## 2023-11-27 PROCEDURE — 3074F SYST BP LT 130 MM HG: CPT | Mod: ,,, | Performed by: NURSE PRACTITIONER

## 2023-11-27 PROCEDURE — 3008F BODY MASS INDEX DOCD: CPT | Mod: ,,, | Performed by: NURSE PRACTITIONER

## 2023-11-27 PROCEDURE — 3078F PR MOST RECENT DIASTOLIC BLOOD PRESSURE < 80 MM HG: ICD-10-PCS | Mod: ,,, | Performed by: NURSE PRACTITIONER

## 2023-11-27 RX ORDER — METHYLPREDNISOLONE 4 MG/1
TABLET ORAL
Qty: 1 EACH | Refills: 0 | Status: SHIPPED | OUTPATIENT
Start: 2023-11-27 | End: 2023-12-28

## 2023-11-27 RX ORDER — IBUPROFEN 800 MG/1
800 TABLET ORAL 3 TIMES DAILY
Qty: 90 TABLET | Refills: 3 | Status: SHIPPED | OUTPATIENT
Start: 2023-11-27

## 2023-11-27 NOTE — LETTER
November 27, 2023      [facility]  [address] [facility] [place] [address]  Phone: [phone]  Fax: [phone]       Patient: Renee Vargas   YOB: 1990  Date of Visit: 11/27/2023    To Whom It May Concern:    Tomasa Vargas  was at [facility] on 11/27/2023. The patient may return to work/school on 11/27/23 with no restrictions. If you have any questions or concerns, or if I can be of further assistance, please do not hesitate to contact me.    Sincerely,    ABDIRIZAK Gloria

## 2023-11-27 NOTE — PROGRESS NOTES
Subjective:       Patient ID: Renee Vargas is a 33 y.o. female.    Chief Complaint: Shoulder Pain (And weakness down left shoulder into hand x 2 weeks.  )    Left shoulder pain with radiation to left wrist x 2wks (has improved)    Shoulder Pain   The pain is present in the left shoulder, left arm, left elbow and left wrist. This is a new problem. The current episode started 1 to 4 weeks ago (2wks ago; happened suddenly). There has been no history of extremity trauma. The problem has been gradually improving. The quality of the pain is described as burning and sharp (numbness down arm; originally had sharp pain in shoulder--now only feels a pulling sensation with ROM). The pain is mild. Associated symptoms include tingling (down to wrist; no fingers involved. Tingling has resolved so far). Pertinent negatives include no fever, headaches, inability to bear weight, itching, joint locking, joint swelling, limited range of motion, numbness, stiffness or visual symptoms. She has tried NSAIDS for the symptoms. The treatment provided moderate relief. There is no history of diabetes, Injuries to Extremity or migraines.   Review of Systems   Constitutional:  Negative for appetite change, fatigue, fever and unexpected weight change.   Eyes:  Negative for visual disturbance.   Respiratory:  Negative for cough, chest tightness and shortness of breath.    Cardiovascular:  Negative for chest pain, palpitations and leg swelling.   Gastrointestinal:  Negative for abdominal distention, abdominal pain and change in bowel habit.   Genitourinary:  Negative for dysuria.   Musculoskeletal:  Negative for back pain, gait problem and stiffness.   Integumentary:  Negative for itching, rash and mole/lesion.   Neurological:  Positive for tingling (down to wrist; no fingers involved. Tingling has resolved so far). Negative for dizziness, numbness and headaches.   Hematological:  Negative for adenopathy.   Psychiatric/Behavioral:  Negative  for sleep disturbance.          Objective:      Physical Exam  Vitals reviewed.   Eyes:      Pupils: Pupils are equal, round, and reactive to light.   Cardiovascular:      Rate and Rhythm: Normal rate and regular rhythm.      Pulses: Normal pulses.      Heart sounds: Normal heart sounds.   Pulmonary:      Effort: Pulmonary effort is normal.      Breath sounds: Normal breath sounds.   Abdominal:      Palpations: Abdomen is soft.   Musculoskeletal:         General: Normal range of motion.      Left shoulder: No swelling, deformity, effusion, laceration, tenderness, bony tenderness or crepitus. Normal range of motion. Normal strength (pt reports arm feels weak; but strength is equal to right). Normal pulse.        Arms:       Cervical back: Normal range of motion and neck supple.   Lymphadenopathy:      Cervical: No cervical adenopathy.   Skin:     General: Skin is warm and dry.      Capillary Refill: Capillary refill takes less than 2 seconds.   Neurological:      Mental Status: She is alert and oriented to person, place, and time.   Psychiatric:         Mood and Affect: Mood normal.         Behavior: Behavior normal.         Assessment:       1. Acute pain of left shoulder        Plan:       Acute pain of left shoulder  Discussed ROM exercises; also advised to youtube shoulder PT exercises  MDP and IBU 800mg (take with food)  Ice left shoulder joint  RTC in 2-3 wks if not improved

## 2023-11-27 NOTE — ASSESSMENT & PLAN NOTE
Discussed ROM exercises; also advised to youtube shoulder PT exercises  MDP and IBU 800mg (take with food)  Ice left shoulder joint  RTC in 2-3 wks if not improved

## 2024-06-26 ENCOUNTER — OFFICE VISIT (OUTPATIENT)
Dept: FAMILY MEDICINE | Facility: CLINIC | Age: 34
End: 2024-06-26
Payer: COMMERCIAL

## 2024-06-26 VITALS
SYSTOLIC BLOOD PRESSURE: 121 MMHG | RESPIRATION RATE: 15 BRPM | HEART RATE: 85 BPM | OXYGEN SATURATION: 99 % | DIASTOLIC BLOOD PRESSURE: 84 MMHG | TEMPERATURE: 99 F | WEIGHT: 182 LBS | BODY MASS INDEX: 33.49 KG/M2 | HEIGHT: 62 IN

## 2024-06-26 DIAGNOSIS — N91.2 AMENORRHEA: ICD-10-CM

## 2024-06-26 DIAGNOSIS — R07.89 CHEST WALL PAIN: Primary | ICD-10-CM

## 2024-06-26 DIAGNOSIS — M41.9 SCOLIOSIS, UNSPECIFIED SCOLIOSIS TYPE, UNSPECIFIED SPINAL REGION: ICD-10-CM

## 2024-06-26 DIAGNOSIS — M25.532 LEFT WRIST PAIN: ICD-10-CM

## 2024-06-26 LAB
B-HCG UR QL: NEGATIVE
CTP QC/QA: YES

## 2024-06-26 PROCEDURE — 99214 OFFICE O/P EST MOD 30 MIN: CPT | Mod: 25,,, | Performed by: NURSE PRACTITIONER

## 2024-06-26 PROCEDURE — 1159F MED LIST DOCD IN RCRD: CPT | Mod: ,,, | Performed by: NURSE PRACTITIONER

## 2024-06-26 PROCEDURE — 81025 URINE PREGNANCY TEST: CPT | Mod: QW,,, | Performed by: NURSE PRACTITIONER

## 2024-06-26 PROCEDURE — 1160F RVW MEDS BY RX/DR IN RCRD: CPT | Mod: ,,, | Performed by: NURSE PRACTITIONER

## 2024-06-26 PROCEDURE — 3079F DIAST BP 80-89 MM HG: CPT | Mod: ,,, | Performed by: NURSE PRACTITIONER

## 2024-06-26 PROCEDURE — 3008F BODY MASS INDEX DOCD: CPT | Mod: ,,, | Performed by: NURSE PRACTITIONER

## 2024-06-26 PROCEDURE — 3074F SYST BP LT 130 MM HG: CPT | Mod: ,,, | Performed by: NURSE PRACTITIONER

## 2024-06-26 PROCEDURE — 96372 THER/PROPH/DIAG INJ SC/IM: CPT | Mod: ,,, | Performed by: NURSE PRACTITIONER

## 2024-06-26 RX ORDER — SPIRONOLACTONE 50 MG/1
50 TABLET, FILM COATED ORAL
COMMUNITY
Start: 2024-01-09

## 2024-06-26 RX ORDER — TRIAMCINOLONE ACETONIDE 1 MG/G
CREAM TOPICAL
COMMUNITY
Start: 2024-01-03

## 2024-06-26 RX ORDER — METHOCARBAMOL 500 MG/1
500 TABLET, FILM COATED ORAL 4 TIMES DAILY PRN
Qty: 40 TABLET | Refills: 0 | Status: SHIPPED | OUTPATIENT
Start: 2024-06-26 | End: 2024-07-06

## 2024-06-26 RX ORDER — KETOROLAC TROMETHAMINE 30 MG/ML
60 INJECTION, SOLUTION INTRAMUSCULAR; INTRAVENOUS
Status: COMPLETED | OUTPATIENT
Start: 2024-06-26 | End: 2024-06-26

## 2024-06-26 RX ADMIN — KETOROLAC TROMETHAMINE 60 MG: 30 INJECTION, SOLUTION INTRAMUSCULAR; INTRAVENOUS at 02:06

## 2024-06-26 NOTE — PATIENT INSTRUCTIONS
Go to ER with worsening, SOB, palpitations, or new symptoms  I am referring to orthopedics  Follow-up with us if no one has called you regarding an appointment to the clinic/doctor that you were referred to within 1 week. [phone]  Wrist splint  Return to clinic as needed.

## 2024-06-26 NOTE — PROGRESS NOTES
"Subjective:       Patient ID: Renee Vargas is a 33 y.o. female.    Chief Complaint: Chest Pain (Right Side - Started About an Hour Ago - Voiced when she takes a deep breath, it hurts - No Numbness - No Tingling )    Presents to clinic as above. States pain in right upper chest with certain movements. States turned a certain way a little while ago and the pain started. Has had similar episode in the past. Went to ER for that episode and everything ok per her report. I also reviewed that ER encounter. No SOB, Cough, N/V, or palpitations. No dizziness. No hx of asthma.         Review of Systems   Constitutional: Negative.    Respiratory: Negative.     Cardiovascular:  Negative for palpitations, orthopnea, claudication, leg swelling and PND.   Gastrointestinal: Negative.    Neurological: Negative.           Reviewed family, medical, surgical, and social history.    Objective:      /84 (BP Location: Left arm, Patient Position: Sitting, BP Method: Medium (Automatic))   Pulse 85   Temp 98.8 °F (37.1 °C) (Oral)   Resp 15   Ht 5' 2" (1.575 m)   Wt 82.6 kg (182 lb)   LMP 06/04/2024 (Exact Date)   SpO2 99%   BMI 33.29 kg/m²   Physical Exam  Vitals and nursing note reviewed.   Constitutional:       General: She is not in acute distress.     Appearance: Normal appearance. She is not ill-appearing, toxic-appearing or diaphoretic.   HENT:      Head: Normocephalic.      Mouth/Throat:      Mouth: Mucous membranes are moist.   Cardiovascular:      Rate and Rhythm: Normal rate and regular rhythm.      Heart sounds: Normal heart sounds.   Pulmonary:      Effort: Pulmonary effort is normal.      Breath sounds: Normal breath sounds.   Chest:          Comments: Pain to right upper chest with twisting. No TTP. HRR with no murmur. No redness/rashes/bruising.  Musculoskeletal:      Cervical back: Normal range of motion and neck supple.   Skin:     General: Skin is warm and dry.      Capillary Refill: Capillary refill takes " less than 2 seconds.   Neurological:      Mental Status: She is alert and oriented to person, place, and time.   Psychiatric:         Mood and Affect: Mood normal.         Behavior: Behavior normal.         Thought Content: Thought content normal.         Judgment: Judgment normal.            Office Visit on 06/26/2024   Component Date Value Ref Range Status    POC Preg Test, Ur 06/26/2024 Negative  Negative Final     Acceptable 06/26/2024 Yes   Final      Assessment:       1. Chest wall pain    2. Left wrist pain    3. Amenorrhea    4. Scoliosis, unspecified scoliosis type, unspecified spinal region        Plan:       Chest wall pain  -     X-Ray Chest PA And Lateral; Future; Expected date: 06/26/2024  -     ketorolac injection 60 mg  -     methocarbamoL (ROBAXIN) 500 MG Tab; Take 1 tablet (500 mg total) by mouth 4 (four) times daily as needed (chest wall pain/muscle pain).  Dispense: 40 tablet; Refill: 0    Left wrist pain  -     X-Ray Wrist Complete 3 views Left; Future; Expected date: 06/26/2024  -     ketorolac injection 60 mg    Amenorrhea  -     POCT urine pregnancy    Scoliosis, unspecified scoliosis type, unspecified spinal region    The scoliosis could be causing her muscle spasms due to malalignment  Notify me if pain persists, and I will refer to spine clinic  RTC PRN          Risks, benefits, and side effects were discussed with the patient. All questions were answered to the fullest satisfaction of the patient, and pt verbalized understanding and agreement to treatment plan. Pt was to call with any new or worsening symptoms, or present to the ER.

## 2025-03-19 ENCOUNTER — OFFICE VISIT (OUTPATIENT)
Dept: NEUROLOGY | Facility: CLINIC | Age: 35
End: 2025-03-19
Payer: COMMERCIAL

## 2025-03-19 VITALS
DIASTOLIC BLOOD PRESSURE: 94 MMHG | HEART RATE: 82 BPM | OXYGEN SATURATION: 99 % | BODY MASS INDEX: 33.34 KG/M2 | HEIGHT: 62 IN | RESPIRATION RATE: 18 BRPM | WEIGHT: 181.19 LBS | SYSTOLIC BLOOD PRESSURE: 138 MMHG

## 2025-03-19 DIAGNOSIS — E03.9 HYPOTHYROIDISM, UNSPECIFIED TYPE: ICD-10-CM

## 2025-03-19 DIAGNOSIS — E53.8 VITAMIN B12 DEFICIENCY: ICD-10-CM

## 2025-03-19 DIAGNOSIS — E55.9 VITAMIN D DEFICIENCY: ICD-10-CM

## 2025-03-19 DIAGNOSIS — R51.9 FREQUENT HEADACHES: Primary | ICD-10-CM

## 2025-03-19 DIAGNOSIS — H81.10 BENIGN PAROXYSMAL POSITIONAL VERTIGO, UNSPECIFIED LATERALITY: ICD-10-CM

## 2025-03-19 PROCEDURE — 99999 PR PBB SHADOW E&M-EST. PATIENT-LVL IV: CPT | Mod: PBBFAC,,, | Performed by: NURSE PRACTITIONER

## 2025-03-19 PROCEDURE — 1160F RVW MEDS BY RX/DR IN RCRD: CPT | Mod: ,,, | Performed by: NURSE PRACTITIONER

## 2025-03-19 PROCEDURE — 99214 OFFICE O/P EST MOD 30 MIN: CPT | Mod: PBBFAC | Performed by: NURSE PRACTITIONER

## 2025-03-19 PROCEDURE — 3008F BODY MASS INDEX DOCD: CPT | Mod: ,,, | Performed by: NURSE PRACTITIONER

## 2025-03-19 PROCEDURE — 1159F MED LIST DOCD IN RCRD: CPT | Mod: ,,, | Performed by: NURSE PRACTITIONER

## 2025-03-19 PROCEDURE — 3075F SYST BP GE 130 - 139MM HG: CPT | Mod: ,,, | Performed by: NURSE PRACTITIONER

## 2025-03-19 PROCEDURE — 99204 OFFICE O/P NEW MOD 45 MIN: CPT | Mod: S$PBB,,, | Performed by: NURSE PRACTITIONER

## 2025-03-19 PROCEDURE — 3080F DIAST BP >= 90 MM HG: CPT | Mod: ,,, | Performed by: NURSE PRACTITIONER

## 2025-03-19 PROCEDURE — 3044F HG A1C LEVEL LT 7.0%: CPT | Mod: ,,, | Performed by: NURSE PRACTITIONER

## 2025-03-19 RX ORDER — TOPIRAMATE 50 MG/1
50 TABLET, FILM COATED ORAL 2 TIMES DAILY
Qty: 60 TABLET | Refills: 11 | Status: SHIPPED | OUTPATIENT
Start: 2025-03-19 | End: 2026-03-19

## 2025-03-19 RX ORDER — TOPIRAMATE 25 MG/1
TABLET, COATED ORAL
COMMUNITY
Start: 2025-01-30 | End: 2025-03-19

## 2025-03-19 RX ORDER — MECLIZINE HYDROCHLORIDE 25 MG/1
TABLET ORAL
COMMUNITY
Start: 2025-01-27

## 2025-03-19 NOTE — PROGRESS NOTES
Subjective:       Patient ID: Renee Vargas is a 34 y.o. female     Chief Complaint:  No chief complaint on file.       Allergies:  Patient has no known allergies.    Current Medications:  Encounter Medications[1]    History of Present Illness  35 y/o female new to neurology, no listed diagnosis was provided in the EMR for today's visit    She reports it is for migraine headaches.  Has history of, followed byus in the past, around 2018.  At that time however it was not for headaches, it was for intermittent dizziness, BPV.  Prior MRI had been reported abnormal, however our MRI brain from 11/9/2018 was negative.    The BPV is very long standing, since at least 2016 per the EMR.  Continues intermittent, very short in duration.  Prior was prescribed antivert, but really episodes short and resolved before she could take the medications.    Headaches are by her report new, developed about a year ago.   She has them very frequently, at least 20 days of the month.  She is currently on topamax, but it is very low dosing of only 25mg daily.  She denies overuse headaches.  Can originate in posterior head or frontal.  Some of them with nausea by her report.  Denies aura, no clear triggers.  No visual changes.  She had MRI brain done here in April of 2022, compared to last one done in 2018 and no changes or acute findings.  No recent visual evaluation however.  No clear neurological deficits on exam today in clinic.    Will obtain updated labs for headaches.  Increase current topamax to 50mg bid.  Recommend updated visual exam.  If not improved will repeat head imaging.           Review of Systems  Review of Systems   Constitutional:  Negative for diaphoresis and fever.   HENT:  Negative for congestion, hearing loss and tinnitus.    Eyes:  Negative for blurred vision, double vision, photophobia, discharge and redness.   Respiratory:  Negative for cough and shortness of breath.    Cardiovascular:  Negative for chest pain.    Gastrointestinal:  Negative for abdominal pain, nausea and vomiting.   Musculoskeletal:  Negative for back pain, joint pain, myalgias and neck pain.   Skin:  Negative for itching and rash.   Neurological:  Positive for dizziness and headaches. Negative for tremors, sensory change, speech change, focal weakness, seizures, loss of consciousness and weakness.   Psychiatric/Behavioral:  Negative for depression, hallucinations and memory loss. The patient does not have insomnia.    All other systems reviewed and are negative.     Objective:     NEUROLOGICAL EXAMINATION:     MENTAL STATUS   Oriented to person, place, and time.   Attention: normal. Concentration: normal.   Speech: speech is normal   Level of consciousness: alert  Knowledge: good and consistent with education.   Normal comprehension.     CRANIAL NERVES     CN II   Visual fields full to confrontation.   Visual acuity: normal  Right visual field deficit: none  Left visual field deficit: none     CN III, IV, VI   Pupils are equal, round, and reactive to light.  Extraocular motions are normal.   Right pupil: Size: 3 mm. Shape: regular. Reactivity: brisk. Consensual response: intact. Accommodation: intact.   Left pupil: Size: 3 mm. Shape: regular. Reactivity: brisk. Consensual response: intact. Accommodation: intact.   CN III: no CN III palsy  CN VI: no CN VI palsy  Nystagmus: none   Diplopia: none  Upgaze: normal  Downgaze: normal  Conjugate gaze: present  Vestibulo-ocular reflex: present    CN V   Facial sensation intact.   Right facial sensation deficit: none  Left facial sensation deficit: none  Right corneal reflex: normal  Left corneal reflex: normal    CN VII   Facial expression full, symmetric.   Right facial weakness: none  Left facial weakness: none  Right taste: normal  Left taste: normal    CN VIII   CN VIII normal.   Hearing: intact    CN IX, X   CN IX normal.   CN X normal.   Palate: symmetric    CN XI   CN XI normal.   Right sternocleidomastoid  strength: normal  Left sternocleidomastoid strength: normal  Right trapezius strength: normal  Left trapezius strength: normal    CN XII   CN XII normal.   Tongue: not atrophic  Fasciculations: absent  Tongue deviation: none    MOTOR EXAM   Muscle bulk: normal  Overall muscle tone: normal  Right arm tone: normal  Left arm tone: normal  Right arm pronator drift: absent  Left arm pronator drift: absent  Right leg tone: normal  Left leg tone: normal    Strength   Right neck flexion: 5/5  Left neck flexion: 5/5  Right neck extension: 5/5  Left neck extension: 5/5  Right deltoid: 5/5  Left deltoid: 5/5  Right biceps: 5/5  Left biceps: 5/5  Right triceps: 5/5  Left triceps: 5/5  Right wrist flexion: 5/5  Left wrist flexion: 5/5  Right wrist extension: 5/5  Left wrist extension: 5/5  Right interossei: 5/5  Left interossei: 5/5  Right iliopsoas: 5/5  Left iliopsoas: 5/5  Right quadriceps: 5/5  Left quadriceps: 5/5  Right hamstrin/5  Left hamstrin/5  Right anterior tibial: 5/5  Left anterior tibial: 5/5  Right posterior tibial: 5/5  Left posterior tibial: 5/5  Right gastroc: 5/5  Left gastroc: 5/5    REFLEXES     Reflexes   Right brachioradialis: 2+  Left brachioradialis: 2+  Right biceps: 2+  Left biceps: 2+  Right triceps: 2+  Left triceps: 2+  Right patellar: 2+  Left patellar: 2+  Right achilles: 2+  Left achilles: 2+  Right plantar: normal  Left plantar: normal  Right Murphy: absent  Left Murphy: absent  Right ankle clonus: absent  Left ankle clonus: absent  Right pendular knee jerk: absent  Left pendular knee jerk: absent    SENSORY EXAM   Light touch normal.   Right arm light touch: normal  Left arm light touch: normal  Right leg light touch: normal  Left leg light touch: normal  Vibration normal.   Right arm vibration: normal  Left arm vibration: normal  Right leg vibration: normal  Left leg vibration: normal  Proprioception normal.   Right arm proprioception: normal  Left arm proprioception: normal  Right  leg proprioception: normal  Left leg proprioception: normal  Pinprick normal.   Right arm pinprick: normal  Left arm pinprick: normal  Right leg pinprick: normal  Left leg pinprick: normal  Graphesthesia: normal  Romberg: negative  Stereognosis: normal    GAIT AND COORDINATION     Gait  Gait: normal     Coordination   Finger to nose coordination: normal  Heel to shin coordination: normal  Tandem walking coordination: normal    Tremor   Resting tremor: absent  Intention tremor: absent  Action tremor: absent       Physical Exam  Vitals and nursing note reviewed.   Constitutional:       Appearance: Normal appearance.   HENT:      Head: Normocephalic.   Eyes:      Extraocular Movements: Extraocular movements intact and EOM normal.      Pupils: Pupils are equal, round, and reactive to light.   Pulmonary:      Effort: Pulmonary effort is normal.   Musculoskeletal:         General: No swelling or tenderness. Normal range of motion.      Cervical back: Normal range of motion and neck supple.      Right lower leg: No edema.      Left lower leg: No edema.   Skin:     General: Skin is warm and dry.      Coloration: Skin is not jaundiced.      Findings: No rash.   Neurological:      General: No focal deficit present.      Mental Status: She is alert and oriented to person, place, and time.      GCS: GCS eye subscore is 4. GCS verbal subscore is 5. GCS motor subscore is 6.      Cranial Nerves: No cranial nerve deficit.      Sensory: No sensory deficit.      Motor: Motor function is intact. No weakness.      Coordination: Coordination is intact. Coordination normal. Finger-Nose-Finger Test, Heel to Shin Test and Romberg Test normal.      Gait: Gait is intact. Gait and tandem walk normal.      Deep Tendon Reflexes: Reflexes normal.      Reflex Scores:       Tricep reflexes are 2+ on the right side and 2+ on the left side.       Bicep reflexes are 2+ on the right side and 2+ on the left side.       Brachioradialis reflexes are 2+  on the right side and 2+ on the left side.       Patellar reflexes are 2+ on the right side and 2+ on the left side.       Achilles reflexes are 2+ on the right side and 2+ on the left side.  Psychiatric:         Mood and Affect: Mood normal.         Speech: Speech normal.         Behavior: Behavior normal.          Assessment:     Problem List Items Addressed This Visit          Neuro    Frequent headaches - Primary    Relevant Orders    Basic Metabolic Panel    CBC Auto Differential       ENT    Benign paroxysmal positional vertigo     Other Visit Diagnoses         Vitamin B12 deficiency        Relevant Orders    Folate    Vitamin B12      Hypothyroidism, unspecified type        Relevant Orders    TSH      Vitamin D deficiency        Relevant Orders    Vitamin D             Primary Diagnosis and ICD10  Frequent headaches [R51.9]    Plan:     There are no Patient Instructions on file for this visit.    Medications Discontinued During This Encounter   Medication Reason    TOPAMAX 25 mg tablet        Requested Prescriptions     Signed Prescriptions Disp Refills    topiramate (TOPAMAX) 50 MG tablet 60 tablet 11     Sig: Take 1 tablet (50 mg total) by mouth 2 (two) times daily.       Orders Placed This Encounter   Procedures    Basic Metabolic Panel    CBC Auto Differential    Folate    TSH    Vitamin B12    Vitamin D            [1]   Outpatient Encounter Medications as of 3/19/2025   Medication Sig Dispense Refill    clindamycin (CLEOCIN T) 1 % lotion       ibuprofen (ADVIL,MOTRIN) 800 MG tablet Take 1 tablet (800 mg total) by mouth 3 (three) times daily. 90 tablet 3    meclizine (ANTIVERT) 25 mg tablet       spironolactone (ALDACTONE) 50 MG tablet Take 50 mg by mouth.      triamcinolone acetonide 0.1% (KENALOG) 0.1 % cream SMARTSI Application Topical 2-3 Times Daily      [DISCONTINUED] TOPAMAX 25 mg tablet       topiramate (TOPAMAX) 50 MG tablet Take 1 tablet (50 mg total) by mouth 2 (two) times daily. 60 tablet  11     No facility-administered encounter medications on file as of 3/19/2025.

## 2025-03-20 ENCOUNTER — RESULTS FOLLOW-UP (OUTPATIENT)
Dept: NEUROLOGY | Facility: CLINIC | Age: 35
End: 2025-03-20
Payer: COMMERCIAL

## 2025-03-20 ENCOUNTER — TELEPHONE (OUTPATIENT)
Dept: NEUROLOGY | Facility: CLINIC | Age: 35
End: 2025-03-20
Payer: COMMERCIAL

## 2025-03-20 DIAGNOSIS — E55.9 VITAMIN D DEFICIENCY: Primary | ICD-10-CM

## 2025-03-20 RX ORDER — ERGOCALCIFEROL 1.25 MG/1
50000 CAPSULE ORAL
Qty: 4 CAPSULE | Refills: 2 | Status: SHIPPED | OUTPATIENT
Start: 2025-03-20

## 2025-03-20 NOTE — TELEPHONE ENCOUNTER
I left detailed message letting pt know that RX had been sent to pharmacy for Vitamin D.      ----- Message from ABDIRIZAK Wynn sent at 3/20/2025  1:17 PM CDT -----  Vitamin D is low, I sent in supplement to take as directed  ----- Message -----  From: Lab, Background User  Sent: 3/19/2025   4:59 PM CDT  To: ABDIRIZAK Aaron

## 2025-04-03 ENCOUNTER — TELEPHONE (OUTPATIENT)
Dept: NEUROLOGY | Facility: CLINIC | Age: 35
End: 2025-04-03
Payer: COMMERCIAL

## 2025-04-03 DIAGNOSIS — E55.9 VITAMIN D DEFICIENCY: ICD-10-CM

## 2025-04-03 RX ORDER — ERGOCALCIFEROL 1.25 MG/1
50000 CAPSULE ORAL
Qty: 4 CAPSULE | Refills: 2 | Status: SHIPPED | OUTPATIENT
Start: 2025-04-03

## 2025-04-03 NOTE — TELEPHONE ENCOUNTER
I tried calling pt and pt mom, no answer and I was unable to leave VM.    ----- Message from ABDIRIZAK Wynn sent at 4/3/2025  4:23 PM CDT -----  Regarding: RE: Vitamin D Supplements  Per the emr, it was sent to [facility] on 3/20/25  ----- Message -----  From: Zahra Bustamante MA  Sent: 4/3/2025   4:20 PM CDT  To: ABDIRIZAK Aaron  Subject: FW: Vitamin D Supplements                        Pt Vitamin D supplements were not sent in.  ----- Message -----  From: Cat Monroy  Sent: 4/3/2025   4:06 PM CDT  To: [facility] Staff  Subject: Vitamin D Supplements                            Who Called: Renee Avelar called to see where her vitamin D supplements were sent. She said she got them prescribed after her lab work, but she doesn't know where they are because they aren't at her pharmacy. I didn't see them in her chart eitherPreferred Method of Contact: Phone CallPatient's Preferred Phone Number on File: [phone] (Sharifa Sam - mom)

## 2025-05-22 ENCOUNTER — OFFICE VISIT (OUTPATIENT)
Dept: NEUROLOGY | Facility: CLINIC | Age: 35
End: 2025-05-22
Payer: COMMERCIAL

## 2025-05-22 VITALS
BODY MASS INDEX: 33.34 KG/M2 | HEIGHT: 62 IN | RESPIRATION RATE: 18 BRPM | OXYGEN SATURATION: 100 % | SYSTOLIC BLOOD PRESSURE: 115 MMHG | HEART RATE: 81 BPM | WEIGHT: 181.19 LBS | DIASTOLIC BLOOD PRESSURE: 76 MMHG

## 2025-05-22 DIAGNOSIS — G43.E19 INTRACTABLE CHRONIC MIGRAINE WITH AURA AND WITHOUT STATUS MIGRAINOSUS: Primary | ICD-10-CM

## 2025-05-22 PROCEDURE — 99213 OFFICE O/P EST LOW 20 MIN: CPT | Mod: S$PBB,,, | Performed by: NURSE PRACTITIONER

## 2025-05-22 PROCEDURE — 3074F SYST BP LT 130 MM HG: CPT | Mod: ,,, | Performed by: NURSE PRACTITIONER

## 2025-05-22 PROCEDURE — 3078F DIAST BP <80 MM HG: CPT | Mod: ,,, | Performed by: NURSE PRACTITIONER

## 2025-05-22 PROCEDURE — 1159F MED LIST DOCD IN RCRD: CPT | Mod: ,,, | Performed by: NURSE PRACTITIONER

## 2025-05-22 PROCEDURE — 3044F HG A1C LEVEL LT 7.0%: CPT | Mod: ,,, | Performed by: NURSE PRACTITIONER

## 2025-05-22 PROCEDURE — 1160F RVW MEDS BY RX/DR IN RCRD: CPT | Mod: ,,, | Performed by: NURSE PRACTITIONER

## 2025-05-22 PROCEDURE — 99999 PR PBB SHADOW E&M-EST. PATIENT-LVL IV: CPT | Mod: PBBFAC,,, | Performed by: NURSE PRACTITIONER

## 2025-05-22 PROCEDURE — 3008F BODY MASS INDEX DOCD: CPT | Mod: ,,, | Performed by: NURSE PRACTITIONER

## 2025-05-22 PROCEDURE — 99214 OFFICE O/P EST MOD 30 MIN: CPT | Mod: PBBFAC | Performed by: NURSE PRACTITIONER

## 2025-05-22 RX ORDER — AMITRIPTYLINE HYDROCHLORIDE 25 MG/1
25 TABLET, FILM COATED ORAL NIGHTLY
Qty: 30 TABLET | Refills: 5 | Status: SHIPPED | OUTPATIENT
Start: 2025-05-22 | End: 2026-05-22

## 2025-05-22 NOTE — PROGRESS NOTES
Subjective:       Patient ID: Renee Vargas is a 34 y.o. female     Chief Complaint:  No chief complaint on file.       Allergies:  Patient has no known allergies.    Current Medications:  Encounter Medications[1]    History of Present Illness  35 y/o female following in neurology for reported migraine headaches    Has history of, followed by us in the past, around 2018.  At that time however it was not for headaches, it was for intermittent dizziness, BPV.  Prior MRI had been reported abnormal, however our MRI brain from 11/9/2018 was negative.    The BPV is very long standing, since at least 2016 per the EMR.  Continues intermittent, very short in duration.  Prior was prescribed antivert, but really episodes short and resolved before she could take the medications.    She reported headaches begain in mid 2024.  Reporting about 20 headache days per month.  Was already on topamax but very low 25mg daily dosing.  Denied overuse headache.  Can originate in posterior head or frontal.  Some of them with nausea by her report.  Denies aura, no clear triggers.  No visual changes.  She had MRI brain done here in April of 2022, compared to last one done in 2018 and no changes or acute findings.  No recent visual evaluation however.  No clear neurological deficits on exam today in clinic.    We obtained labs, her vitamin D was low and rx was sent to pharmacy.  I increased the topamax to 50mg bid and recommended updated visual exam.  She reports headache frequency improved to about 2 days per week, but she is experiencing cognitive impairment symptoms.  Will change her to elavil 25mg at night.           Review of Systems  Review of Systems   Constitutional:  Negative for diaphoresis and fever.   HENT:  Negative for congestion, hearing loss and tinnitus.    Eyes:  Negative for blurred vision, double vision, photophobia, discharge and redness.   Respiratory:  Negative for cough and shortness of breath.    Cardiovascular:   Negative for chest pain.   Gastrointestinal:  Negative for abdominal pain, nausea and vomiting.   Musculoskeletal:  Negative for back pain, joint pain, myalgias and neck pain.   Skin:  Negative for itching and rash.   Neurological:  Positive for dizziness and headaches. Negative for tremors, sensory change, speech change, focal weakness, seizures, loss of consciousness and weakness.   Psychiatric/Behavioral:  Negative for depression, hallucinations and memory loss. The patient does not have insomnia.    All other systems reviewed and are negative.     Objective:     NEUROLOGICAL EXAMINATION:     MENTAL STATUS   Oriented to person, place, and time.   Attention: normal. Concentration: normal.   Speech: speech is normal   Level of consciousness: alert  Knowledge: good and consistent with education.   Normal comprehension.     CRANIAL NERVES     CN II   Visual fields full to confrontation.   Visual acuity: normal  Right visual field deficit: none  Left visual field deficit: none     CN III, IV, VI   Pupils are equal, round, and reactive to light.  Extraocular motions are normal.   Right pupil: Size: 3 mm. Shape: regular. Reactivity: brisk. Consensual response: intact. Accommodation: intact.   Left pupil: Size: 3 mm. Shape: regular. Reactivity: brisk. Consensual response: intact. Accommodation: intact.   CN III: no CN III palsy  CN VI: no CN VI palsy  Nystagmus: none   Diplopia: none  Upgaze: normal  Downgaze: normal  Conjugate gaze: present  Vestibulo-ocular reflex: present    CN V   Facial sensation intact.   Right facial sensation deficit: none  Left facial sensation deficit: none  Right corneal reflex: normal  Left corneal reflex: normal    CN VII   Facial expression full, symmetric.   Right facial weakness: none  Left facial weakness: none  Right taste: normal  Left taste: normal    CN VIII   CN VIII normal.   Hearing: intact    CN IX, X   CN IX normal.   CN X normal.   Palate: symmetric    CN XI   CN XI normal.    Right sternocleidomastoid strength: normal  Left sternocleidomastoid strength: normal  Right trapezius strength: normal  Left trapezius strength: normal    CN XII   CN XII normal.   Tongue: not atrophic  Fasciculations: absent  Tongue deviation: none    MOTOR EXAM   Muscle bulk: normal  Overall muscle tone: normal  Right arm tone: normal  Left arm tone: normal  Right arm pronator drift: absent  Left arm pronator drift: absent  Right leg tone: normal  Left leg tone: normal    Strength   Right neck flexion: 5/5  Left neck flexion: 5/5  Right neck extension: 5/5  Left neck extension: 5/5  Right deltoid: 5/5  Left deltoid: 5/5  Right biceps: 5/5  Left biceps: 5/5  Right triceps: 5/5  Left triceps: 5/5  Right wrist flexion: 5/5  Left wrist flexion: 5/5  Right wrist extension: 5/5  Left wrist extension: 5/5  Right interossei: 5/5  Left interossei: 5/5  Right iliopsoas: 5/5  Left iliopsoas: 5/5  Right quadriceps: 5/5  Left quadriceps: 5/5  Right hamstrin/5  Left hamstrin/5  Right anterior tibial: 5/5  Left anterior tibial: 5/5  Right posterior tibial: 5/5  Left posterior tibial: 5/5  Right gastroc: 5/5  Left gastroc: 5/5    REFLEXES     Reflexes   Right brachioradialis: 2+  Left brachioradialis: 2+  Right biceps: 2+  Left biceps: 2+  Right triceps: 2+  Left triceps: 2+  Right patellar: 2+  Left patellar: 2+  Right achilles: 2+  Left achilles: 2+  Right plantar: normal  Left plantar: normal  Right Murphy: absent  Left Murphy: absent  Right ankle clonus: absent  Left ankle clonus: absent  Right pendular knee jerk: absent  Left pendular knee jerk: absent    SENSORY EXAM   Light touch normal.   Right arm light touch: normal  Left arm light touch: normal  Right leg light touch: normal  Left leg light touch: normal  Vibration normal.   Right arm vibration: normal  Left arm vibration: normal  Right leg vibration: normal  Left leg vibration: normal  Proprioception normal.   Right arm proprioception: normal  Left arm  proprioception: normal  Right leg proprioception: normal  Left leg proprioception: normal  Pinprick normal.   Right arm pinprick: normal  Left arm pinprick: normal  Right leg pinprick: normal  Left leg pinprick: normal  Graphesthesia: normal  Romberg: negative  Stereognosis: normal    GAIT AND COORDINATION     Gait  Gait: normal     Coordination   Finger to nose coordination: normal  Heel to shin coordination: normal  Tandem walking coordination: normal    Tremor   Resting tremor: absent  Intention tremor: absent  Action tremor: absent       Physical Exam  Vitals and nursing note reviewed.   Constitutional:       Appearance: Normal appearance.   HENT:      Head: Normocephalic.   Eyes:      Extraocular Movements: Extraocular movements intact and EOM normal.      Pupils: Pupils are equal, round, and reactive to light.   Pulmonary:      Effort: Pulmonary effort is normal.   Musculoskeletal:         General: No swelling or tenderness. Normal range of motion.      Cervical back: Normal range of motion and neck supple.      Right lower leg: No edema.      Left lower leg: No edema.   Skin:     General: Skin is warm and dry.      Coloration: Skin is not jaundiced.      Findings: No rash.   Neurological:      General: No focal deficit present.      Mental Status: She is alert and oriented to person, place, and time.      GCS: GCS eye subscore is 4. GCS verbal subscore is 5. GCS motor subscore is 6.      Cranial Nerves: No cranial nerve deficit.      Sensory: No sensory deficit.      Motor: Motor function is intact. No weakness.      Coordination: Coordination is intact. Coordination normal. Finger-Nose-Finger Test, Heel to Shin Test and Romberg Test normal.      Gait: Gait is intact. Gait and tandem walk normal.      Deep Tendon Reflexes: Reflexes normal.      Reflex Scores:       Tricep reflexes are 2+ on the right side and 2+ on the left side.       Bicep reflexes are 2+ on the right side and 2+ on the left side.        Brachioradialis reflexes are 2+ on the right side and 2+ on the left side.       Patellar reflexes are 2+ on the right side and 2+ on the left side.       Achilles reflexes are 2+ on the right side and 2+ on the left side.  Psychiatric:         Mood and Affect: Mood normal.         Speech: Speech normal.         Behavior: Behavior normal.          Assessment:     Problem List Items Addressed This Visit          Neuro    Intractable chronic migraine with aura and without status migrainosus - Primary    Relevant Medications    amitriptyline (ELAVIL) 25 MG tablet          Primary Diagnosis and ICD10  Intractable chronic migraine with aura and without status migrainosus [G43.E19]    Plan:     Patient Instructions   Decrease topamax to once daily for 7 days then stop  Start elavil 25mg daily  Follow-up 3 months    Medications Discontinued During This Encounter   Medication Reason    topiramate (TOPAMAX) 50 MG tablet          Requested Prescriptions     Signed Prescriptions Disp Refills    amitriptyline (ELAVIL) 25 MG tablet 30 tablet 5     Sig: Take 1 tablet (25 mg total) by mouth every evening.       No orders of the defined types were placed in this encounter.             [1]   Outpatient Encounter Medications as of 2025   Medication Sig Dispense Refill    clindamycin (CLEOCIN T) 1 % lotion       ergocalciferol (ERGOCALCIFEROL) 50,000 unit Cap Take 1 capsule (50,000 Units total) by mouth every 7 days. 4 capsule 2    ibuprofen (ADVIL,MOTRIN) 800 MG tablet Take 1 tablet (800 mg total) by mouth 3 (three) times daily. 90 tablet 3    meclizine (ANTIVERT) 25 mg tablet       spironolactone (ALDACTONE) 50 MG tablet Take 50 mg by mouth.      triamcinolone acetonide 0.1% (KENALOG) 0.1 % cream SMARTSI Application Topical 2-3 Times Daily      [DISCONTINUED] topiramate (TOPAMAX) 50 MG tablet Take 1 tablet (50 mg total) by mouth 2 (two) times daily. 60 tablet 11    amitriptyline (ELAVIL) 25 MG tablet Take 1 tablet (25 mg  total) by mouth every evening. 30 tablet 5     No facility-administered encounter medications on file as of 5/22/2025.

## 2025-08-28 ENCOUNTER — OFFICE VISIT (OUTPATIENT)
Dept: NEUROLOGY | Facility: CLINIC | Age: 35
End: 2025-08-28
Payer: COMMERCIAL

## 2025-08-28 VITALS
HEART RATE: 95 BPM | WEIGHT: 188.63 LBS | SYSTOLIC BLOOD PRESSURE: 119 MMHG | OXYGEN SATURATION: 99 % | DIASTOLIC BLOOD PRESSURE: 80 MMHG | BODY MASS INDEX: 34.71 KG/M2 | HEIGHT: 62 IN

## 2025-08-28 DIAGNOSIS — H81.10 BENIGN PAROXYSMAL POSITIONAL VERTIGO, UNSPECIFIED LATERALITY: ICD-10-CM

## 2025-08-28 DIAGNOSIS — G43.E19 INTRACTABLE CHRONIC MIGRAINE WITH AURA AND WITHOUT STATUS MIGRAINOSUS: Primary | ICD-10-CM

## 2025-08-28 PROCEDURE — 3079F DIAST BP 80-89 MM HG: CPT | Mod: ,,, | Performed by: NURSE PRACTITIONER

## 2025-08-28 PROCEDURE — 1160F RVW MEDS BY RX/DR IN RCRD: CPT | Mod: ,,, | Performed by: NURSE PRACTITIONER

## 2025-08-28 PROCEDURE — 1159F MED LIST DOCD IN RCRD: CPT | Mod: ,,, | Performed by: NURSE PRACTITIONER

## 2025-08-28 PROCEDURE — 99999 PR PBB SHADOW E&M-EST. PATIENT-LVL IV: CPT | Mod: PBBFAC,,, | Performed by: NURSE PRACTITIONER

## 2025-08-28 PROCEDURE — 99213 OFFICE O/P EST LOW 20 MIN: CPT | Mod: S$PBB,,, | Performed by: NURSE PRACTITIONER

## 2025-08-28 PROCEDURE — 99214 OFFICE O/P EST MOD 30 MIN: CPT | Mod: PBBFAC | Performed by: NURSE PRACTITIONER

## 2025-08-28 PROCEDURE — 3044F HG A1C LEVEL LT 7.0%: CPT | Mod: ,,, | Performed by: NURSE PRACTITIONER

## 2025-08-28 PROCEDURE — 3074F SYST BP LT 130 MM HG: CPT | Mod: ,,, | Performed by: NURSE PRACTITIONER

## 2025-08-28 PROCEDURE — 3008F BODY MASS INDEX DOCD: CPT | Mod: ,,, | Performed by: NURSE PRACTITIONER

## 2025-08-28 RX ORDER — AMITRIPTYLINE HYDROCHLORIDE 25 MG/1
25 TABLET, FILM COATED ORAL NIGHTLY
Qty: 30 TABLET | Refills: 5 | Status: SHIPPED | OUTPATIENT
Start: 2025-08-28 | End: 2026-08-28

## 2025-08-28 RX ORDER — METHOCARBAMOL 750 MG/1
TABLET, FILM COATED ORAL
COMMUNITY
Start: 2025-08-09

## 2025-08-28 RX ORDER — MECLIZINE HYDROCHLORIDE 25 MG/1
25 TABLET ORAL
COMMUNITY
Start: 2024-09-27

## 2025-08-28 RX ORDER — TACROLIMUS 1 MG/G
OINTMENT TOPICAL
COMMUNITY
Start: 2025-07-07

## 2025-08-28 RX ORDER — DOXYCYCLINE 100 MG/1
CAPSULE ORAL
COMMUNITY
Start: 2025-07-07